# Patient Record
Sex: MALE | Race: WHITE | NOT HISPANIC OR LATINO | URBAN - METROPOLITAN AREA
[De-identification: names, ages, dates, MRNs, and addresses within clinical notes are randomized per-mention and may not be internally consistent; named-entity substitution may affect disease eponyms.]

---

## 2018-03-10 ENCOUNTER — INPATIENT (INPATIENT)
Facility: HOSPITAL | Age: 68
LOS: 11 days | Discharge: HOME | DRG: 301 | End: 2018-03-22
Attending: INTERNAL MEDICINE | Admitting: INTERNAL MEDICINE
Payer: COMMERCIAL

## 2018-03-10 VITALS
HEART RATE: 100 BPM | RESPIRATION RATE: 18 BRPM | DIASTOLIC BLOOD PRESSURE: 84 MMHG | OXYGEN SATURATION: 96 % | SYSTOLIC BLOOD PRESSURE: 137 MMHG | TEMPERATURE: 98 F

## 2018-03-10 DIAGNOSIS — I82.409 ACUTE EMBOLISM AND THROMBOSIS OF UNSPECIFIED DEEP VEINS OF UNSPECIFIED LOWER EXTREMITY: ICD-10-CM

## 2018-03-10 LAB
ALBUMIN SERPL ELPH-MCNC: 3.5 G/DL — SIGNIFICANT CHANGE UP (ref 3.5–5)
ALP SERPL-CCNC: 45 U/L — SIGNIFICANT CHANGE UP (ref 40–120)
ALT FLD-CCNC: 18 U/L DA — SIGNIFICANT CHANGE UP (ref 10–60)
ANION GAP SERPL CALC-SCNC: 5 MMOL/L — SIGNIFICANT CHANGE UP (ref 5–17)
APTT BLD: 42.2 SEC — HIGH (ref 27.5–37.4)
AST SERPL-CCNC: 21 U/L — SIGNIFICANT CHANGE UP (ref 10–40)
BASOPHILS # BLD AUTO: 0.1 K/UL — SIGNIFICANT CHANGE UP (ref 0–0.2)
BASOPHILS NFR BLD AUTO: 0.9 % — SIGNIFICANT CHANGE UP (ref 0–2)
BILIRUB SERPL-MCNC: 0.3 MG/DL — SIGNIFICANT CHANGE UP (ref 0.2–1.2)
BUN SERPL-MCNC: 14 MG/DL — SIGNIFICANT CHANGE UP (ref 7–18)
CALCIUM SERPL-MCNC: 9.2 MG/DL — SIGNIFICANT CHANGE UP (ref 8.4–10.5)
CHLORIDE SERPL-SCNC: 108 MMOL/L — SIGNIFICANT CHANGE UP (ref 96–108)
CO2 SERPL-SCNC: 28 MMOL/L — SIGNIFICANT CHANGE UP (ref 22–31)
CREAT SERPL-MCNC: 0.72 MG/DL — SIGNIFICANT CHANGE UP (ref 0.5–1.3)
EOSINOPHIL # BLD AUTO: 0.1 K/UL — SIGNIFICANT CHANGE UP (ref 0–0.5)
EOSINOPHIL NFR BLD AUTO: 1.5 % — SIGNIFICANT CHANGE UP (ref 0–6)
GLUCOSE SERPL-MCNC: 97 MG/DL — SIGNIFICANT CHANGE UP (ref 70–99)
HCT VFR BLD CALC: 42.2 % — SIGNIFICANT CHANGE UP (ref 39–50)
HGB BLD-MCNC: 13.4 G/DL — SIGNIFICANT CHANGE UP (ref 13–17)
INR BLD: 1.71 RATIO — HIGH (ref 0.88–1.16)
LYMPHOCYTES # BLD AUTO: 1.7 K/UL — SIGNIFICANT CHANGE UP (ref 1–3.3)
LYMPHOCYTES # BLD AUTO: 21.6 % — SIGNIFICANT CHANGE UP (ref 13–44)
MCHC RBC-ENTMCNC: 31.8 GM/DL — LOW (ref 32–36)
MCHC RBC-ENTMCNC: 32.2 PG — SIGNIFICANT CHANGE UP (ref 27–34)
MCV RBC AUTO: 101.4 FL — HIGH (ref 80–100)
MONOCYTES # BLD AUTO: 0.6 K/UL — SIGNIFICANT CHANGE UP (ref 0–0.9)
MONOCYTES NFR BLD AUTO: 7.1 % — SIGNIFICANT CHANGE UP (ref 2–14)
NEUTROPHILS # BLD AUTO: 5.4 K/UL — SIGNIFICANT CHANGE UP (ref 1.8–7.4)
NEUTROPHILS NFR BLD AUTO: 69 % — SIGNIFICANT CHANGE UP (ref 43–77)
PLATELET # BLD AUTO: 248 K/UL — SIGNIFICANT CHANGE UP (ref 150–400)
POTASSIUM SERPL-MCNC: 4.4 MMOL/L — SIGNIFICANT CHANGE UP (ref 3.5–5.3)
POTASSIUM SERPL-SCNC: 4.4 MMOL/L — SIGNIFICANT CHANGE UP (ref 3.5–5.3)
PROT SERPL-MCNC: 7.3 G/DL — SIGNIFICANT CHANGE UP (ref 6–8.3)
PROTHROM AB SERPL-ACNC: 18.8 SEC — HIGH (ref 9.8–12.7)
RBC # BLD: 4.16 M/UL — LOW (ref 4.2–5.8)
RBC # FLD: 12.3 % — SIGNIFICANT CHANGE UP (ref 10.3–14.5)
SODIUM SERPL-SCNC: 141 MMOL/L — SIGNIFICANT CHANGE UP (ref 135–145)
WBC # BLD: 7.8 K/UL — SIGNIFICANT CHANGE UP (ref 3.8–10.5)
WBC # FLD AUTO: 7.8 K/UL — SIGNIFICANT CHANGE UP (ref 3.8–10.5)

## 2018-03-10 PROCEDURE — 99285 EMERGENCY DEPT VISIT HI MDM: CPT

## 2018-03-10 PROCEDURE — 93971 EXTREMITY STUDY: CPT | Mod: 26,RT

## 2018-03-10 RX ORDER — ENOXAPARIN SODIUM 100 MG/ML
80 INJECTION SUBCUTANEOUS ONCE
Qty: 0 | Refills: 0 | Status: COMPLETED | OUTPATIENT
Start: 2018-03-10 | End: 2018-03-10

## 2018-03-10 RX ADMIN — ENOXAPARIN SODIUM 80 MILLIGRAM(S): 100 INJECTION SUBCUTANEOUS at 22:44

## 2018-03-10 NOTE — ED PROVIDER NOTE - ATTENDING CONTRIBUTION TO CARE
Attending MD Felix:   I personally have seen and examined this patient.  NP note reviewed and agree on plan of care and except where noted.  See MDM for details.

## 2018-03-10 NOTE — ED ADULT TRIAGE NOTE - CHIEF COMPLAINT QUOTE
Right lower leg pain and swelling. Patient recently d/c from [facility] with diagnosis of DVT, currently on Xeralto.

## 2018-03-10 NOTE — ED ADULT NURSE NOTE - CHIEF COMPLAINT QUOTE
Right lower leg pain and swelling. Patient recently d/c from [facility] with diagnosis of DVT, currently on Xeralto.

## 2018-03-10 NOTE — ED PROVIDER NOTE - MEDICAL DECISION MAKING DETAILS
68 y/o male referred to ED for further eval of R leg DVT. No records of previous results or current ultrasound results. Will get doppler and reassess. 66 y/o male referred to ED for further eval of R leg DVT. No records of previous results or current ultrasound results. Will get doppler and reassess.    Elvira PEREZ: 66 y/o male with recently diagnosed RLE DVT on Xarelto for 3 weeks here for eval of worsening RLE swelling. Patient saw his PMD today who performed another US and referred him here. Denies signs or symptoms of PE. Eval shows a man in NAD with clear lungs and cardiac S1S2 noted. RLE swelling w/o erythema. Plan DVT US and reassess.

## 2018-03-10 NOTE — ED PROVIDER NOTE - OBJECTIVE STATEMENT
66 y/o male with PMHx of DVT presents to the ED c/o RLE DVT x yesterday. Pt notes he was seen in [facility] a while back and was Dx with RLE DVT, was Rx Xarelto (15mg). Pt had f/u with doctor yesterday, ultrasound still showed DVT. Dose was increased to 20 mg and referred to the ED for further eval. Pt denies dizziness, chest pain, shortness of breath, dyspnea on exertion, fever, or any other complaints. NKDA. 68 y/o male with PMHx of DVT (diagnosed 3 weeks ago at [facility]) presents to the ED c/o RLE worsening pain/swelling. Pt notes he was seen in [facility] a while back and was Dx with RLE DVT, was Rx Xarelto (15mg). Pt had f/u with doctor today, had repeat ultrasound and was referred to ED. Dose was increased to 20 mg and referred to the ED for further eval. Pt denies dizziness, chest pain, shortness of breath, dyspnea on exertion, fever, or any other complaints. NKDA.

## 2018-03-10 NOTE — ED PROVIDER NOTE - PROGRESS NOTE DETAILS
US shows extensive DVT. Unable to compare to previous US. Patient denies cardiac complaints. Discussed case with Dr Isabel. Will admit. Discussed with PMD Dr Anderson and patient. Will give dose of Lovenox. Elvira PEREZ: Patient reassessed and reported persistent pain on his RLE and being on Xarelto for the past 3 weeks (15 mg PO BID) and changing the dose to 20 mg today. Patient saw his PMD today and had an US that showed a larger DVT burden. Patient denies CP, lightheaededness, CP or palpitations or syncope. Consider worse burden of DVT or Phlebitis. Plan DVT US and reassess. Patient will need admission if symptoms worsen or DVT is found to be larger.

## 2018-03-11 DIAGNOSIS — R06.09 OTHER FORMS OF DYSPNEA: ICD-10-CM

## 2018-03-11 DIAGNOSIS — R52 PAIN, UNSPECIFIED: ICD-10-CM

## 2018-03-11 DIAGNOSIS — I82.409 ACUTE EMBOLISM AND THROMBOSIS OF UNSPECIFIED DEEP VEINS OF UNSPECIFIED LOWER EXTREMITY: ICD-10-CM

## 2018-03-11 DIAGNOSIS — Z29.9 ENCOUNTER FOR PROPHYLACTIC MEASURES, UNSPECIFIED: ICD-10-CM

## 2018-03-11 LAB
ANION GAP SERPL CALC-SCNC: 4 MMOL/L — LOW (ref 5–17)
BUN SERPL-MCNC: 13 MG/DL — SIGNIFICANT CHANGE UP (ref 7–18)
CALCIUM SERPL-MCNC: 8.7 MG/DL — SIGNIFICANT CHANGE UP (ref 8.4–10.5)
CHLORIDE SERPL-SCNC: 106 MMOL/L — SIGNIFICANT CHANGE UP (ref 96–108)
CK MB BLD-MCNC: <2.1 % — SIGNIFICANT CHANGE UP (ref 0–3.5)
CK MB CFR SERPL CALC: <1 NG/ML — SIGNIFICANT CHANGE UP (ref 0–3.6)
CK SERPL-CCNC: 47 U/L — SIGNIFICANT CHANGE UP (ref 35–232)
CK SERPL-CCNC: 74 U/L — SIGNIFICANT CHANGE UP (ref 35–232)
CO2 SERPL-SCNC: 28 MMOL/L — SIGNIFICANT CHANGE UP (ref 22–31)
CREAT SERPL-MCNC: 0.81 MG/DL — SIGNIFICANT CHANGE UP (ref 0.5–1.3)
FOLATE SERPL-MCNC: 5.9 NG/ML — SIGNIFICANT CHANGE UP (ref 4.8–24.2)
GLUCOSE SERPL-MCNC: 114 MG/DL — HIGH (ref 70–99)
MAGNESIUM SERPL-MCNC: 1.9 MG/DL — SIGNIFICANT CHANGE UP (ref 1.6–2.6)
PHOSPHATE SERPL-MCNC: 3.4 MG/DL — SIGNIFICANT CHANGE UP (ref 2.5–4.5)
POTASSIUM SERPL-MCNC: 4.1 MMOL/L — SIGNIFICANT CHANGE UP (ref 3.5–5.3)
POTASSIUM SERPL-SCNC: 4.1 MMOL/L — SIGNIFICANT CHANGE UP (ref 3.5–5.3)
SODIUM SERPL-SCNC: 138 MMOL/L — SIGNIFICANT CHANGE UP (ref 135–145)
TROPONIN I SERPL-MCNC: <0.015 NG/ML — SIGNIFICANT CHANGE UP (ref 0–0.04)
VIT B12 SERPL-MCNC: 267 PG/ML — SIGNIFICANT CHANGE UP (ref 232–1245)

## 2018-03-11 PROCEDURE — 71275 CT ANGIOGRAPHY CHEST: CPT | Mod: 26

## 2018-03-11 PROCEDURE — G0452: CPT | Mod: 26

## 2018-03-11 PROCEDURE — 71045 X-RAY EXAM CHEST 1 VIEW: CPT | Mod: 26

## 2018-03-11 RX ORDER — IBUPROFEN 200 MG
400 TABLET ORAL EVERY 8 HOURS
Qty: 0 | Refills: 0 | Status: DISCONTINUED | OUTPATIENT
Start: 2018-03-11 | End: 2018-03-11

## 2018-03-11 RX ORDER — ACETAMINOPHEN 500 MG
650 TABLET ORAL EVERY 6 HOURS
Qty: 0 | Refills: 0 | Status: DISCONTINUED | OUTPATIENT
Start: 2018-03-11 | End: 2018-03-22

## 2018-03-11 RX ORDER — PANTOPRAZOLE SODIUM 20 MG/1
40 TABLET, DELAYED RELEASE ORAL
Qty: 0 | Refills: 0 | Status: DISCONTINUED | OUTPATIENT
Start: 2018-03-11 | End: 2018-03-22

## 2018-03-11 RX ORDER — MORPHINE SULFATE 50 MG/1
2 CAPSULE, EXTENDED RELEASE ORAL ONCE
Qty: 0 | Refills: 0 | Status: DISCONTINUED | OUTPATIENT
Start: 2018-03-11 | End: 2018-03-11

## 2018-03-11 RX ORDER — SODIUM CHLORIDE 9 MG/ML
500 INJECTION INTRAMUSCULAR; INTRAVENOUS; SUBCUTANEOUS ONCE
Qty: 0 | Refills: 0 | Status: COMPLETED | OUTPATIENT
Start: 2018-03-11 | End: 2018-03-11

## 2018-03-11 RX ORDER — MORPHINE SULFATE 50 MG/1
2 CAPSULE, EXTENDED RELEASE ORAL EVERY 6 HOURS
Qty: 0 | Refills: 0 | Status: DISCONTINUED | OUTPATIENT
Start: 2018-03-11 | End: 2018-03-11

## 2018-03-11 RX ORDER — SODIUM CHLORIDE 9 MG/ML
1000 INJECTION INTRAMUSCULAR; INTRAVENOUS; SUBCUTANEOUS
Qty: 0 | Refills: 0 | Status: DISCONTINUED | OUTPATIENT
Start: 2018-03-11 | End: 2018-03-22

## 2018-03-11 RX ORDER — ENOXAPARIN SODIUM 100 MG/ML
80 INJECTION SUBCUTANEOUS
Qty: 0 | Refills: 0 | Status: DISCONTINUED | OUTPATIENT
Start: 2018-03-11 | End: 2018-03-12

## 2018-03-11 RX ORDER — KETOROLAC TROMETHAMINE 30 MG/ML
15 SYRINGE (ML) INJECTION ONCE
Qty: 0 | Refills: 0 | Status: DISCONTINUED | OUTPATIENT
Start: 2018-03-11 | End: 2018-03-11

## 2018-03-11 RX ADMIN — ENOXAPARIN SODIUM 80 MILLIGRAM(S): 100 INJECTION SUBCUTANEOUS at 05:35

## 2018-03-11 RX ADMIN — SODIUM CHLORIDE 1000 MILLILITER(S): 9 INJECTION INTRAMUSCULAR; INTRAVENOUS; SUBCUTANEOUS at 12:44

## 2018-03-11 RX ADMIN — MORPHINE SULFATE 2 MILLIGRAM(S): 50 CAPSULE, EXTENDED RELEASE ORAL at 12:41

## 2018-03-11 RX ADMIN — SODIUM CHLORIDE 100 MILLILITER(S): 9 INJECTION INTRAMUSCULAR; INTRAVENOUS; SUBCUTANEOUS at 12:43

## 2018-03-11 RX ADMIN — MORPHINE SULFATE 2 MILLIGRAM(S): 50 CAPSULE, EXTENDED RELEASE ORAL at 13:23

## 2018-03-11 RX ADMIN — ENOXAPARIN SODIUM 80 MILLIGRAM(S): 100 INJECTION SUBCUTANEOUS at 17:31

## 2018-03-11 RX ADMIN — Medication 400 MILLIGRAM(S): at 08:19

## 2018-03-11 RX ADMIN — Medication 400 MILLIGRAM(S): at 08:55

## 2018-03-11 NOTE — H&P ADULT - NSHPREVIEWOFSYSTEMS_GEN_ALL_CORE
REVIEW OF SYSTEMS:    CONSTITUTIONAL: No weakness, fevers or chills  EYES/ENT: No visual changes;  No vertigo or throat pain   NECK: No pain or stiffness  RESPIRATORY: No cough, wheezing, hemoptysis; No shortness of breath  CARDIOVASCULAR: No chest pain or palpitations  GASTROINTESTINAL: No abdominal or epigastric pain. No nausea, vomiting, or hematemesis; No diarrhea or constipation. No melena or hematochezia.  GENITOURINARY: No dysuria, frequency or hematuria  NEUROLOGICAL: No numbness or weakness  EXT; right lower extremities swelling and pain and redness.   SKIN: No itching, rashes  No other complaint except mentioned as above.

## 2018-03-11 NOTE — H&P ADULT - NSHPPHYSICALEXAM_GEN_ALL_CORE
PHYSICAL EXAM:    GENERAL: NAD, well-developed    HEAD:  Atraumatic, Normocephalic    EYES: EOMI, PERRLA, conjunctiva and sclera clear    NECK: Supple, No JVD    CHEST/LUNG: Clear to auscultation bilaterally; No wheeze    HEART: Regular rate and rhythm; No murmurs, rubs, or gallops    ABDOMEN: Soft, Nontender, Nondistended; Bowel sounds present    EXTREMITIES:  2+ Peripheral Pulses, No clubbing, cyanosis,   red, swelling and edema of right lower extremities    PSYCH: AAOx3    NEUROLOGY: non-focal    SKIN: No rashes or lesions    No other pertinent positive finding except mentioned as above.

## 2018-03-11 NOTE — PROGRESS NOTE ADULT - SUBJECTIVE AND OBJECTIVE BOX
Patient seen and examined at bedside  Case discussed with medical team    c/o severe rle pain, malaise, pleuritic substernal chest pain     HPI:  67 M with history of DVT started having right lower leg pain 2 months ago and seen at [facility] and diagnosed with DVT 3 weeks ago and started on Xarelto 15mg BID and today switched to Xarelto 20mg daily presented with right lower leg swelling getting worse. pt followed up with PCP for persistent swelling and pain and repeated doppler study outpatient which showed clot increased in size and was referred to go to ED. Patient denied fever, chest pain, palpitation, dyspnea, nausea, vomiting and any other symptoms and claimed his symptoms does not limit his ambulatory status.  In ED, doppler study with Above and below the knee thrombosis of the right lower extremity. (11 Mar 2018 04:42)      PAST MEDICAL & SURGICAL HISTORY:  DVT (deep venous thrombosis)  No significant past surgical history      No Known Allergies       MEDICATIONS  (STANDING):  enoxaparin Injectable 80 milliGRAM(s) SubCutaneous two times a day    MEDICATIONS  (PRN):  acetaminophen   Tablet. 650 milliGRAM(s) Oral every 6 hours PRN Mild Pain (1 - 3)  ibuprofen  Tablet 400 milliGRAM(s) Oral every 8 hours PRN moderate pain      REVIEW OF SYSTEMS:  CONSTITUTIONAL: (+) malaise.   EYES: No acute change in vision   ENT:  No tinnitus  NECK: No stiffness  RESPIRATORY: No hemoptysis  CARDIOVASCULAR: No chest pain, palpitations, syncope  GASTROINTESTINAL: No hematemesis, diarrhea, melena, or hematochezia.  GENITOURINARY: No hematuria  NEUROLOGICAL: No headaches  LYMPH Nodes: No enlarged glands  ENDOCRINE: No heat or cold intolerance	    T(C): 36.8 (03-11-18 @ 09:36), Max: 37.1 (03-10-18 @ 20:40)  HR: 66 (03-11-18 @ 09:36) (65 - 100)  BP: 107/70 (03-11-18 @ 09:36) (107/70 - 137/84)  RR: 16 (03-11-18 @ 09:36) (16 - 18)  SpO2: 96% (03-11-18 @ 09:36) (96% - 98%)    PHYSICAL EXAMINATION:   Constitutional: WD, NAD  HEENT: NC, AT  Neck:  Supple  Respiratory:  Adequate airflow b/l. Not using accessory muscles of respiration.  Cardiovascular:  S1 & S2 intact, no R/G, 2+ radial pulses b/l  Gastrointestinal: Soft, NT, ND, normoactive b.s., no organomegaly/RT/rigidity  Extremities: WWP  Neurological:  Alert and awake.  No acute focal motor deficits. Crude sensation intact.     Labs and imaging reviewed    LABS:                        13.4   7.8   )-----------( 248      ( 10 Mar 2018 16:59 )             42.2     03-10    141  |  108  |  14  ----------------------------<  97  4.4   |  28  |  0.72    Ca    9.2      10 Mar 2018 16:59    TPro  7.3  /  Alb  3.5  /  TBili  0.3  /  DBili  x   /  AST  21  /  ALT  18  /  AlkPhos  45  03-10    CARDIAC MARKERS ( 11 Mar 2018 03:13 )  x     / x     / 74 U/L / x     / x          PT/INR - ( 10 Mar 2018 16:59 )   PT: 18.8 sec;   INR: 1.71 ratio         PTT - ( 10 Mar 2018 16:59 )  PTT:42.2 sec    CAPILLARY BLOOD GLUCOSE            LIVER FUNCTIONS - ( 10 Mar 2018 16:59 )  Alb: 3.5 g/dL / Pro: 7.3 g/dL / ALK PHOS: 45 U/L / ALT: 18 U/L DA / AST: 21 U/L / GGT: x               RADIOLOGY & ADDITIONAL STUDIES:    < from: Xray Chest 1 View- PORTABLE-Urgent (03.11.18 @ 09:32) >    EXAM:  XR CHEST PORTABLE URGENT 1V                            PROCEDURE DATE:  03/11/2018          INTERPRETATION:  CLINICAL INFORMATION: Abnormal chest sounds.    TECHNIQUE: Frontal view of the chest   COMPARISON: None.    FINDINGS:    LUNGS/PLEURA: No focal consolidation, pleural effusion, or pneumothorax.  MEDIASTINUM: Cardiomediastinal silhouette is unremarkable.  OTHER: Old right-sided rib fractures.    IMPRESSION:     No focal consolidation or pleural effusion.            < end of copied text >

## 2018-03-11 NOTE — CONSULT NOTE ADULT - SUBJECTIVE AND OBJECTIVE BOX
Patient is a 67y old  Male who presents with a chief complaint of right lower leg pain (11 Mar 2018 04:42)    Apparently unprovoked right lower ext DVT three weeks back when he presented with leg swellng and pain. As per wife, in [facility] he was found to have a mild embolus in lung also. He was discharged on Xarelto 15 bid. It improved a bit but started to worsen, saw primary physician who advised him to go to ER  Here found to  have extensive R lower ext DVT. He is now switched to Full dose lovenox and About to undergo CTA    Denies any pmh  He is retired and worked in construction  Family: Mother had liver cancer      HPI:  67 M with history of DVT started having right lower leg pain 2 months ago and seen at [facility] and diagnosed with DVT 3 weeks ago and started on Xarelto 15mg BID and today switched to Xarelto 20mg daily presented with right lower leg swelling getting worse. pt followed up with PCP for persistent swelling and pain and repeated doppler study outpatient which showed clot increased in size and was referred to go to ED. Patient denied fever, chest pain, palpitation, dyspnea, nausea, vomiting and any other symptoms and claimed his symptoms does not limit his ambulatory status.  In ED, doppler study with Above and below the knee thrombosis of the right lower extremity. (11 Mar 2018 04:42)       ROS:  no headaches, no chest pain, no nausea no vomiting, no headaches. no itche or rash.   Has right leg swelling and tenderness          MEDICATIONS  (STANDING):  enoxaparin Injectable 80 milliGRAM(s) SubCutaneous two times a day  pantoprazole    Tablet 40 milliGRAM(s) Oral before breakfast  sodium chloride 0.9%. 1000 milliLiter(s) (100 mL/Hr) IV Continuous <Continuous>    MEDICATIONS  (PRN):  acetaminophen   Tablet. 650 milliGRAM(s) Oral every 6 hours PRN Mild Pain (1 - 3)  morphine  - Injectable 2 milliGRAM(s) IV Push every 6 hours PRN Moderate Pain (4 - 6)      Allergies    No Known Allergies    Intolerances        Vital Signs Last 24 Hrs  T(C): 36.3 (11 Mar 2018 12:47), Max: 37.1 (10 Mar 2018 20:40)  T(F): 97.3 (11 Mar 2018 12:47), Max: 98.7 (10 Mar 2018 20:40)  HR: 72 (11 Mar 2018 12:47) (65 - 100)  BP: 109/78 (11 Mar 2018 12:47) (107/70 - 137/84)  BP(mean): --  RR: 16 (11 Mar 2018 12:47) (16 - 18)  SpO2: 97% (11 Mar 2018 12:47) (96% - 98%)    PHYSICAL EXAM  General: adult in NAD  HEENT: clear oropharynx, anicteric sclera, pink conjunctiva  Neck: supple  CV: normal S1/S2 with no murmur rubs or gallops  Lungs: positive air movement b/l ant lungs,clear to auscultation, no wheezes, no rales  Abdomen: soft non-tender non-distended, Liver palpalble  Ext:  right leg swelling and tenderness  Skin: no rashes and no petechiae  Neuro: alert and oriented X 4, no focal deficits      LABS:                          13.4   7.8   )-----------( 248      ( 10 Mar 2018 16:59 )             42.2         Mean Cell Volume : 101.4 fl  Mean Cell Hemoglobin : 32.2 pg  Mean Cell Hemoglobin Concentration : 31.8 gm/dL  Auto Neutrophil # : 5.4 K/uL  Auto Lymphocyte # : 1.7 K/uL  Auto Monocyte # : 0.6 K/uL  Auto Eosinophil # : 0.1 K/uL  Auto Basophil # : 0.1 K/uL  Auto Neutrophil % : 69.0 %  Auto Lymphocyte % : 21.6 %  Auto Monocyte % : 7.1 %  Auto Eosinophil % : 1.5 %  Auto Basophil % : 0.9 %      Serial CBC's  03-10 @ 16:59  Hct-42.2 / Hgb-13.4 / Plat-248 / RBC-4.16 / WBC-7.8      03-11    138  |  106  |  13  ----------------------------<  114<H>  4.1   |  28  |  0.81    Ca    8.7      11 Mar 2018 12:21  Phos  3.4     03-11  Mg     1.9     03-11    TPro  7.3  /  Alb  3.5  /  TBili  0.3  /  DBili  x   /  AST  21  /  ALT  18  /  AlkPhos  45  03-10      PT/INR - ( 10 Mar 2018 16:59 )   PT: 18.8 sec;   INR: 1.71 ratio         PTT - ( 10 Mar 2018 16:59 )  PTT:42.2 sec

## 2018-03-11 NOTE — H&P ADULT - PROBLEM SELECTOR PLAN 1
-Doppler with right above and below knee clot  -unprovoked and failed outpatient Xarelto treatement  -failed outpatient Xarelto treatment  -will give Lovenox full dose  - f/u genetic work up for coagulation factor -Doppler with right above and below knee clot  -unprovoked and failed outpatient Xarelto treatement  -failed outpatient Xarelto treatment  -will give Lovenox full dose  - f/u genetic work up for coagulation factor  -Hem/onc-  -Cardio-  -monitor clinical status, replace electrolytes prn, monitor vitals -Doppler with right above and below knee clot  -unprovoked and failed outpatient Xarelto treatement  -failed outpatient Xarelto treatment  -will give Lovenox full dose  -initially tachy which improved. F/u cxr.  - f/u genetic work up for coagulation factor  -Hem/onc-  -Cardio-  -monitor clinical status, replace electrolytes prn, monitor vitals

## 2018-03-11 NOTE — H&P ADULT - NSHPLABSRESULTS_GEN_ALL_CORE
Vital Signs Last 24 Hrs  T(C): 36.4 (11 Mar 2018 00:01), Max: 37.1 (10 Mar 2018 20:40)  T(F): 97.5 (11 Mar 2018 00:01), Max: 98.7 (10 Mar 2018 20:40)  HR: 65 (11 Mar 2018 00:01) (65 - 100)  BP: 113/75 (11 Mar 2018 00:01) (113/75 - 137/84)  BP(mean): --  RR: 18 (11 Mar 2018 00:01) (16 - 18)  SpO2: 98% (11 Mar 2018 00:01) (96% - 98%)      Labs:                        13.4   7.8   )-----------( 248      ( 10 Mar 2018 16:59 )             42.2     03-10    141  |  108  |  14  ----------------------------<  97  4.4   |  28  |  0.72    Ca    9.2      10 Mar 2018 16:59    TPro  7.3  /  Alb  3.5  /  TBili  0.3  /  DBili  x   /  AST  21  /  ALT  18  /  AlkPhos  45  03-10    < from: US Duplex Venous Lower Ext Ltd, Right (03.10.18 @ 19:19) >    Above and below the knee thrombosis of the right lower extremity as   described    < end of copied text >

## 2018-03-11 NOTE — H&P ADULT - ASSESSMENT
67 M with history of DVT started having right lower leg pain 2 months ago and seen at [place] and diagnosed with DVT 3 weeks ago and started on Xarelto 15mg BID and today switched to Xarelto 20mg daily presented with right lower leg swelling getting worse.

## 2018-03-11 NOTE — CONSULT NOTE ADULT - ASSESSMENT
APPARENTLY UNPROVOKED RIGHT LOWER EXT DVT AND POSSIBLE PE  Did not respond to xarelto after some initial improvement  Now on Lovenox    Recommend the Followin. Check CT Abdomen and Pelvis for organomegaly and any lymphadenopathy in the right lower quadrant. It is unusual not to respond initially to DOAC's unless there is a local physical issue ( rule ot malignancy)  2. Change Lovenox from twice a day to every 12 hours for a stable drug level round the clock.  3. check for antiphospholipid antibodies ( lupus anticoagulant will be unreliable while on anticoagulation). Rest of 'hypercoag' workup is not needed. Positive antiphospholipid antibody, if confirmed, will need life long anticoagulation  4. If malignancy or other local reason is ruled out , he will need life long anticoagulation  5. check d dimer     will follow    Nasir Gondal  [phone] APPARENTLY UNPROVOKED RIGHT LOWER EXT DVT AND POSSIBLE PE  Did not respond to xarelto after some initial improvement  Now on Lovenox    Recommend the Followin. Check CT Abdomen and Pelvis for organomegaly and any lymphadenopathy in the right lower quadrant. It is unusual not to respond initially to DOAC's unless there is a local physical issue ( rule ot malignancy)  2. Change Lovenox from twice a day to every 12 hours for a stable drug level round the clock.  3. check for antiphospholipid antibodies ( lupus anticoagulant will be unreliable while on anticoagulation). Rest of 'hypercoag' workup is not needed. Positive antiphospholipid antibody, if confirmed, will need life long anticoagulation  4. If malignancy or other local reason is ruled out , he will need life long anticoagulation  5. check d dimer   6. High MCV. Check b12 folate, tsh  7. Mildly elevated coags are due to xarelto    will follow    Nasir Gondal  [phone]

## 2018-03-11 NOTE — H&P ADULT - HISTORY OF PRESENT ILLNESS
67 M with history of DVT started having right lower leg pain 2 months ago and seen at [place] and diagnosed with DVT 3 weeks ago and started on Xarelto 15mg BID and today switched to Xarelto 20mg daily presented with right lower leg swelling getting worse. pt followed up with PCP for persistent swelling and pain and repeated doppler study outpatient which showed clot increased in size and was referred to go to ED. Patient denied fever, chest pain, palpitation, dyspnea, nausea, vomiting and any other symptoms and claimed his symptoms does not limit his ambulatory status.  In ED, doppler study with Above and below the knee thrombosis of the right lower extremity.

## 2018-03-11 NOTE — CONSULT NOTE ADULT - SUBJECTIVE AND OBJECTIVE BOX
Time of visit:    CHIEF COMPLAINT: Patient is a 67y old  Male who presents with a chief complaint of right lower leg pain (11 Mar 2018 04:42)      HPI:  67 M with history of DVT started having right lower leg pain 2 months ago and seen at [place] and diagnosed with DVT 3 weeks ago and started on Xarelto 15mg BID and today switched to Xarelto 20mg daily presented with right lower leg swelling getting worse. pt followed up with PCP for persistent swelling and pain and repeated doppler study outpatient which showed clot increased in size and was referred to go to ED. Patient denied fever, chest pain, palpitation, dyspnea, nausea, vomiting and any other symptoms and claimed his symptoms does not limit his ambulatory status.  In ED, doppler study with Above and below the knee thrombosis of the right lower extremity. (11 Mar 2018 04:42)   Patient seen and examined.     PAST MEDICAL & SURGICAL HISTORY:  DVT (deep venous thrombosis)  No significant past surgical history      Allergies    No Known Allergies    Intolerances        MEDICATIONS  (STANDING):  enoxaparin Injectable 80 milliGRAM(s) SubCutaneous every 12 hours  pantoprazole    Tablet 40 milliGRAM(s) Oral before breakfast  sodium chloride 0.9%. 1000 milliLiter(s) (100 mL/Hr) IV Continuous <Continuous>      MEDICATIONS  (PRN):  acetaminophen   Tablet. 650 milliGRAM(s) Oral every 6 hours PRN Mild Pain (1 - 3)  morphine  - Injectable 2 milliGRAM(s) IV Push every 6 hours PRN Moderate Pain (4 - 6)       Medications up to date at time of exam.    FAMILY HISTORY:  No pertinent family history in first degree relatives      SOCIAL HISTORY  Smoking History: [   ] smoking/smoke exposure, [   ] former smoker, [   ] denies smoking  Living Condition: [   ] apartment, [   ] private house  Work History:   Travel History: denies recent travel  Illicit Substance Use: denies  Alcohol Use: denies    REVIEW OF SYSTEMS:    CONSTITUTIONAL:  denies fevers, chills, sweats, weight loss    HEENT:  denies diplopia or blurred vision, sore throat or runny nose.    CARDIOVASCULAR:  denies pressure, squeezing, tightness, or heaviness about the chest; no palpitations.    RESPIRATORY:  denies SOB, cough, SUTTON, wheezing.    GASTROINTESTINAL:  denies abdominal pain, nausea, vomiting or diarrhea.    GENITOURINARY: denies dysuria, frequency or urgency.    NEUROLOGIC:  denies numbness, tingling, seizures or weakness.    PSYCHIATRIC:  denies disorder of thought or mood.    MSK: denies swelling, redness      PHYSICAL EXAMINATION:    GENERAL: The patient is a well-developed, well-nourished, in no apparent distress.     Vital Signs Last 24 Hrs  T(C): 36.3 (12 Mar 2018 17:24), Max: 37.1 (12 Mar 2018 05:44)  T(F): 97.3 (12 Mar 2018 17:24), Max: 98.7 (12 Mar 2018 05:44)  HR: 69 (12 Mar 2018 17:24) (60 - 80)  BP: 117/72 (12 Mar 2018 17:24) (110/71 - 125/83)  BP(mean): --  RR: 19 (12 Mar 2018 17:24) (16 - 19)  SpO2: 100% (12 Mar 2018 17:24) (96% - 100%)    HEENT: head is normocephalic and atraumatic.     NECK: supple, no palpable adenopathy.    LUNGS: clear to auscultation, no wheezing, rales, or rhonchi.    HEART: regular rate and rhythm without murmur.    ABDOMEN: soft, nontender, and nondistended.     EXTREMITIES: without any cyanosis, clubbing, rash, lesions or edema.    NEUROLOGIC: awake, alert.    SKIN: warm, dry, good turgor.      LABS:    03-12    141  |  107  |  11  ----------------------------<  96  4.2   |  28  |  0.68    Ca    8.9      12 Mar 2018 06:19  Phos  3.4     03-11  Mg     1.9     03-11            CARDIAC MARKERS ( 11 Mar 2018 15:29 )  <0.015 ng/mL / x     / 47 U/L / x     / <1.0 ng/mL  CARDIAC MARKERS ( 11 Mar 2018 03:13 )  x     / x     / 74 U/L / x     / x                    .    MICROBIOLOGY: (if applicable)    RADIOLOGY & ADDITIONAL STUDIES:  EKG:   CXR:  ECHO:  TELE:    IMPRESSION: 67y Male PAST MEDICAL & SURGICAL HISTORY:  DVT (deep venous thrombosis)  No significant past surgical history       p/w     RECOMMENDATIONS: Time of visit:    CHIEF COMPLAINT: Patient is a 67y old  Male who presents with a chief complaint of right lower leg pain (11 Mar 2018 04:42)      HPI:  67 M with history of DVT started having right lower leg pain 2 months ago and seen at [place] and diagnosed with DVT 3 weeks ago and started on Xarelto 15mg BID and today switched to Xarelto 20mg daily presented with right lower leg swelling getting worse. pt followed up with PCP for persistent swelling and pain and repeated doppler study outpatient which showed clot increased in size and was referred to go to ED. Patient denied fever, chest pain, palpitation, dyspnea, nausea, vomiting and any other symptoms and claimed his symptoms does not limit his ambulatory status.  In ED, doppler study with Above and below the knee thrombosis of the right lower extremity. (11 Mar 2018 04:42)   Patient seen and examined.     PAST MEDICAL & SURGICAL HISTORY:  DVT (deep venous thrombosis)  No significant past surgical history      Allergies    No Known Allergies    Intolerances        MEDICATIONS  (STANDING):  enoxaparin Injectable 80 milliGRAM(s) SubCutaneous every 12 hours  pantoprazole    Tablet 40 milliGRAM(s) Oral before breakfast  sodium chloride 0.9%. 1000 milliLiter(s) (100 mL/Hr) IV Continuous <Continuous>      MEDICATIONS  (PRN):  acetaminophen   Tablet. 650 milliGRAM(s) Oral every 6 hours PRN Mild Pain (1 - 3)  morphine  - Injectable 2 milliGRAM(s) IV Push every 6 hours PRN Moderate Pain (4 - 6)       Medications up to date at time of exam.    FAMILY HISTORY:  No pertinent family history in first degree relatives      SOCIAL HISTORY  Smoking History: [   ] smoking/smoke exposure, [   ] former smoker, [   ] denies smoking  Living Condition: [   ] apartment, [   ] private house  Work History:   Travel History: denies recent travel  Illicit Substance Use: denies  Alcohol Use: denies    REVIEW OF SYSTEMS:    CONSTITUTIONAL:  denies fevers, chills, sweats, weight loss    HEENT:  denies diplopia or blurred vision, sore throat or runny nose.    CARDIOVASCULAR:  denies pressure, squeezing, tightness, or heaviness about the chest; no palpitations.    RESPIRATORY:  denies SOB, cough, SUTTON, wheezing.    GASTROINTESTINAL:  denies abdominal pain, nausea, vomiting or diarrhea.    GENITOURINARY: denies dysuria, frequency or urgency.    NEUROLOGIC:  denies numbness, tingling, seizures or weakness.    PSYCHIATRIC:  denies disorder of thought or mood.    MSK: denies swelling, redness      PHYSICAL EXAMINATION:    GENERAL: The patient is a well-developed, well-nourished, in no apparent distress.     Vital Signs Last 24 Hrs  T(C): 36.3 (12 Mar 2018 17:24), Max: 37.1 (12 Mar 2018 05:44)  T(F): 97.3 (12 Mar 2018 17:24), Max: 98.7 (12 Mar 2018 05:44)  HR: 69 (12 Mar 2018 17:24) (60 - 80)  BP: 117/72 (12 Mar 2018 17:24) (110/71 - 125/83)  BP(mean): --  RR: 19 (12 Mar 2018 17:24) (16 - 19)  SpO2: 100% (12 Mar 2018 17:24) (96% - 100%)    HEENT: head is normocephalic and atraumatic.     NECK: supple, no palpable adenopathy.    LUNGS: clear to auscultation, no wheezing, rales, or rhonchi.    HEART: regular rate and rhythm without murmur.    ABDOMEN: soft, nontender, and nondistended.     EXTREMITIES: without any cyanosis, clubbing, rash, lesions or+ RLE edema and erythema    NEUROLOGIC: awake, alert.    SKIN: warm, dry, good turgor.      LABS:    03-12    141  |  107  |  11  ----------------------------<  96  4.2   |  28  |  0.68    Ca    8.9      12 Mar 2018 06:19  Phos  3.4     03-11  Mg     1.9     03-11            CARDIAC MARKERS ( 11 Mar 2018 15:29 )  <0.015 ng/mL / x     / 47 U/L / x     / <1.0 ng/mL  CARDIAC MARKERS ( 11 Mar 2018 03:13 )  x     / x     / 74 U/L / x     / x                    .    MICROBIOLOGY: (if applicable)    RADIOLOGY & ADDITIONAL STUDIES:  EKG:   CXR:  ECHO:   TELE:    IMPRESSION: 67y Male PAST MEDICAL & SURGICAL HISTORY:  DVT (deep venous thrombosis)  No significant past surgical history         Patient sent from [facility] for evaluation of increased size of R leg DVT, while on xarelto. Patient also found to have "mild PE" at [facility]    RECOMMENDATIONS:    admit to tele  2D echo  no acute events noted on tele  a/c for DVT

## 2018-03-11 NOTE — PROGRESS NOTE ADULT - PROBLEM SELECTOR PLAN 1
-unprovoked and failed outpatient Xarelto treatement  -failed outpatient Xarelto treatment  -c/w full dose lovenox  -F/u CTA Chest  -Hem/onc-  -Cardio-  -Pulm-  -monitor clinical status, replace electrolytes prn, monitor vitals

## 2018-03-12 LAB
ANION GAP SERPL CALC-SCNC: 6 MMOL/L — SIGNIFICANT CHANGE UP (ref 5–17)
AT III ACT/NOR PPP CHRO: 62 % — LOW (ref 85–135)
BUN SERPL-MCNC: 11 MG/DL — SIGNIFICANT CHANGE UP (ref 7–18)
CALCIUM SERPL-MCNC: 8.9 MG/DL — SIGNIFICANT CHANGE UP (ref 8.4–10.5)
CHLORIDE SERPL-SCNC: 107 MMOL/L — SIGNIFICANT CHANGE UP (ref 96–108)
CHOLEST SERPL-MCNC: 152 MG/DL — SIGNIFICANT CHANGE UP (ref 10–199)
CO2 SERPL-SCNC: 28 MMOL/L — SIGNIFICANT CHANGE UP (ref 22–31)
CREAT SERPL-MCNC: 0.68 MG/DL — SIGNIFICANT CHANGE UP (ref 0.5–1.3)
GLUCOSE SERPL-MCNC: 96 MG/DL — SIGNIFICANT CHANGE UP (ref 70–99)
HBA1C BLD-MCNC: 5.8 % — HIGH (ref 4–5.6)
HCV AB S/CO SERPL IA: 0.14 S/CO — SIGNIFICANT CHANGE UP
HCV AB SERPL-IMP: SIGNIFICANT CHANGE UP
HDLC SERPL-MCNC: 57 MG/DL — SIGNIFICANT CHANGE UP (ref 40–125)
LIPID PNL WITH DIRECT LDL SERPL: 77 MG/DL — SIGNIFICANT CHANGE UP
POTASSIUM SERPL-MCNC: 4.2 MMOL/L — SIGNIFICANT CHANGE UP (ref 3.5–5.3)
POTASSIUM SERPL-SCNC: 4.2 MMOL/L — SIGNIFICANT CHANGE UP (ref 3.5–5.3)
PROT C ACT/NOR PPP: 69 % — LOW (ref 74–150)
PROT S FREE AG PPP IA-ACNC: 90 % — SIGNIFICANT CHANGE UP (ref 67–141)
SODIUM SERPL-SCNC: 141 MMOL/L — SIGNIFICANT CHANGE UP (ref 135–145)
TOTAL CHOLESTEROL/HDL RATIO MEASUREMENT: 2.7 RATIO — LOW (ref 3.4–9.6)
TRIGL SERPL-MCNC: 92 MG/DL — SIGNIFICANT CHANGE UP (ref 10–149)
TSH SERPL-MCNC: 3.04 UU/ML — SIGNIFICANT CHANGE UP (ref 0.34–4.82)

## 2018-03-12 PROCEDURE — 74176 CT ABD & PELVIS W/O CONTRAST: CPT | Mod: 26

## 2018-03-12 PROCEDURE — 93306 TTE W/DOPPLER COMPLETE: CPT | Mod: 26

## 2018-03-12 RX ORDER — ENOXAPARIN SODIUM 100 MG/ML
80 INJECTION SUBCUTANEOUS EVERY 12 HOURS
Qty: 0 | Refills: 0 | Status: DISCONTINUED | OUTPATIENT
Start: 2018-03-12 | End: 2018-03-22

## 2018-03-12 RX ADMIN — ENOXAPARIN SODIUM 80 MILLIGRAM(S): 100 INJECTION SUBCUTANEOUS at 05:22

## 2018-03-12 RX ADMIN — PANTOPRAZOLE SODIUM 40 MILLIGRAM(S): 20 TABLET, DELAYED RELEASE ORAL at 08:36

## 2018-03-12 RX ADMIN — ENOXAPARIN SODIUM 80 MILLIGRAM(S): 100 INJECTION SUBCUTANEOUS at 18:29

## 2018-03-12 NOTE — CONSULT NOTE ADULT - SUBJECTIVE AND OBJECTIVE BOX
CHIEF COMPLAINT: Patient is a 67y old  Male who presents with a chief complaint of right lower leg pain (11 Mar 2018 04:42)      HPI:  67 M with history of DVT started having right lower leg pain 2 months ago and seen at [place] and diagnosed with DVT 3 weeks ago and started on Xarelto 15mg BID and today switched to Xarelto 20mg daily presented with right lower leg swelling getting worse. pt followed up with PCP for persistent swelling and pain and repeated doppler study outpatient which showed clot increased in size and was referred to go to ED. Patient denied fever, chest pain, palpitation, dyspnea, nausea, vomiting and any other symptoms and claimed his symptoms does not limit his ambulatory status.  In ED, doppler study with Above and below the knee thrombosis of the right lower extremity. (11 Mar 2018 04:42)   Patient seen and examined.     PAST MEDICAL & SURGICAL HISTORY:  DVT (deep venous thrombosis)  No significant past surgical history      Allergies    No Known Allergies    Intolerances        MEDICATIONS  (STANDING):  enoxaparin Injectable 80 milliGRAM(s) SubCutaneous two times a day  pantoprazole    Tablet 40 milliGRAM(s) Oral before breakfast  sodium chloride 0.9%. 1000 milliLiter(s) (100 mL/Hr) IV Continuous <Continuous>      MEDICATIONS  (PRN):  acetaminophen   Tablet. 650 milliGRAM(s) Oral every 6 hours PRN Mild Pain (1 - 3)  morphine  - Injectable 2 milliGRAM(s) IV Push every 6 hours PRN Moderate Pain (4 - 6)   Medications up to date at time of exam.    FAMILY HISTORY:  No pertinent family history in first degree relatives      SOCIAL HISTORY  Smoking History: [   ] smoking/smoke exposure, [   ] former smoker, [ x ] denies smoking  Living Condition: [   ] apartment, [   ] private house  Work History: construction  Travel History: denies recent travel  Illicit Substance Use: denies  Alcohol Use: denies    REVIEW OF SYSTEMS:    CONSTITUTIONAL:  denies fevers, chills, sweats, weight loss    HEENT:  denies diplopia or blurred vision, sore throat or runny nose.    CARDIOVASCULAR:  denies pressure, squeezing, tightness, or heaviness about the chest; no palpitations.    RESPIRATORY:  denies SOB, cough, SUTTON, wheezing.    GASTROINTESTINAL:  denies abdominal pain, nausea, vomiting or diarrhea.    GENITOURINARY: denies dysuria, frequency or urgency.    NEUROLOGIC:  denies numbness, tingling, seizures or weakness.    PSYCHIATRIC:  denies disorder of thought or mood.    MSK: denies swelling, redness      PHYSICAL EXAMINATION:    GENERAL: The patient is a well-developed, well-nourished, in no apparent distress.     Vital Signs Last 24 Hrs  T(C): 36.9 (12 Mar 2018 11:23), Max: 37.1 (12 Mar 2018 05:44)  T(F): 98.5 (12 Mar 2018 11:23), Max: 98.7 (12 Mar 2018 05:44)  HR: 80 (12 Mar 2018 11:23) (60 - 80)  BP: 112/74 (12 Mar 2018 11:23) (110/71 - 125/83)  BP(mean): --  RR: 18 (12 Mar 2018 11:23) (16 - 18)  SpO2: 98% (12 Mar 2018 11:23) (96% - 98%)   (if applicable)    Chest Tube (if applicable)    HEENT: Head is normocephalic and atraumatic. .    NECK: Supple, no palpable adenopathy.    LUNGS: Clear to auscultation, no wheezing, rales, or rhonchi.    HEART: Regular rate and rhythm without murmur.    ABDOMEN: Soft, nontender, and nondistended.  No hepatosplenomegaly is noted.    EXTREMITIES: Without any cyanosis, clubbing, rash, lesions or +R ankle edema, erythema     NEUROLOGIC: Awake, alert.    SKIN: Warm, dry, good turgor.      LABS:                        13.4   7.8   )-----------( 248      ( 10 Mar 2018 16:59 )             42.2     03-12    141  |  107  |  11  ----------------------------<  96  4.2   |  28  |  0.68    Ca    8.9      12 Mar 2018 06:19  Phos  3.4     03-11  Mg     1.9     03-11    TPro  7.3  /  Alb  3.5  /  TBili  0.3  /  DBili  x   /  AST  21  /  ALT  18  /  AlkPhos  45  03-10    PT/INR - ( 10 Mar 2018 16:59 )   PT: 18.8 sec;   INR: 1.71 ratio         PTT - ( 10 Mar 2018 16:59 )  PTT:42.2 sec      CARDIAC MARKERS ( 11 Mar 2018 15:29 )  <0.015 ng/mL / x     / 47 U/L / x     / <1.0 ng/mL  CARDIAC MARKERS ( 11 Mar 2018 03:13 )  x     / x     / 74 U/L / x     / x                    MICROBIOLOGY: (if applicable)    RADIOLOGY & ADDITIONAL STUDIES:  EKG:   CXR:  < from: Xray Chest 1 View- PORTABLE-Urgent (03.11.18 @ 09:32) >    EXAM:  XR CHEST PORTABLE URGENT 1V                            PROCEDURE DATE:  03/11/2018          INTERPRETATION:  CLINICAL INFORMATION: Abnormal chest sounds.    TECHNIQUE: Frontal view of the chest   COMPARISON: None.    FINDINGS:    LUNGS/PLEURA: No focal consolidation, pleural effusion, or pneumothorax.  MEDIASTINUM: Cardiomediastinal silhouette is unremarkable.  OTHER: Old right-sided rib fractures.    IMPRESSION:     No focal consolidation or pleural effusion.                JOSIE HAYDEN M.D., ATTENDING RADIOLOGIST  This document has been electronically signed. Mar 11 2018 11:52AM                < end of copied text >    < from: CT Angio Chest w/ IV Cont (03.11.18 @ 14:39) >    EXAM:  CT ANGIO CHEST (W)AW IC                            PROCEDURE DATE:  03/11/2018          INTERPRETATION:  CLINICAL INFORMATION: Chest pain, right lower extremity   DVT.    COMPARISON: None.    PROCEDURE:   CT Angiography of the Chest.  Intravenous contrast: 70 mL Omnipaque 350. 30 mL discarded.  Sagittal and coronal reformats were performed as well as MIPS.    FINDINGS:    LUNGS AND LARGE AIRWAYS: Patent central airways. Right basilar   subsegmental atelectasis.  PLEURA: No pleural effusion.  VESSELS: Adequate contrast opacification of the pulmonary arteries which   are visualized to the level of the segmental arteries. No pulmonary   embolism.   HEART: Heart size is normal. No pericardial effusion.  MEDIASTINUM AND RISHABH: No lymphadenopathy.  CHEST WALL AND LOWER NECK: Within normal limits.  VISUALIZED UPPER ABDOMEN: Cholelithiasis.  BONES: Degenerative changes of the spine.    IMPRESSION: No pulmonary embolism.                JOSIE HAYDEN M.D., ATTENDING RADIOLOGIST  This documenthas been electronically signed. Mar 11 2018  2:52PM                < end of copied text >        ECHO:    < from: US Duplex Venous Lower Ext Ltd, Right (03.10.18 @ 19:19) >    EXAM:  US DPLX LWR EXT VEINS LTD RT                            PROCEDURE DATE:  03/10/2018          INTERPRETATION:  CLINICAL INFORMATION: Left leg swelling, assess DVT. DVT   diagnosed 3 weeks ago.    COMPARISON: None available.    TECHNIQUE: Duplex sonography of the RIGHT LOWER extremity with color and   spectral Doppler, with and without compression.      FINDINGS: The right common femoral vein and the proximal femoral vein are   patent. There is partial thrombosis of the right mid and distal femoral   vein, popliteal vein and posterior tibial vein.     IMPRESSION:     Above and below the knee thrombosis of the right lower extremity as   described.     Discussed with Dr. Kim.                MARISA AGUILAR M.D., ATTENDING RADIOLOGIST  Thisdocument has been electronically signed. Mar 10 2018  7:29PM                < end of copied text >      IMPRESSION: 67y Male PAST MEDICAL & SURGICAL HISTORY:  DVT (deep venous thrombosis)  No significant past surgical history       Patient sent from [facility] for evaluation of increased size of R leg DVT, while on xarelto. Patient also found to have "mild PE" at [facility].    RECOMMENDATIONS:     - con't w/ lovenox   - f/u heme/onc, antiphospholipid pending   - bronchodilators prn   - DVT and GI prophylaxis.

## 2018-03-12 NOTE — PROGRESS NOTE ADULT - SUBJECTIVE AND OBJECTIVE BOX
Patient is a 67y old  Male who presents with a chief complaint of right lower leg pain (11 Mar 2018 04:42)    No Known Allergies      INTERVAL HPI /OVERNIGHT EVENTS: no acute overnight events. On encounter patient offers no new complaints. remains afebrile.    T(C): 36.9 (03-12-18 @ 11:23), Max: 37.1 (03-12-18 @ 05:44)  HR: 80 (03-12-18 @ 11:23) (60 - 80)  BP: 112/74 (03-12-18 @ 11:23) (110/71 - 125/83)  RR: 18 (03-12-18 @ 11:23) (16 - 18)  SpO2: 98% (03-12-18 @ 11:23) (96% - 98%)  I&O's Summary    Vital Signs Last 24 Hrs  T(C): 36.9 (12 Mar 2018 11:23), Max: 37.1 (12 Mar 2018 05:44)  T(F): 98.5 (12 Mar 2018 11:23), Max: 98.7 (12 Mar 2018 05:44)  HR: 80 (12 Mar 2018 11:23) (60 - 80)  BP: 112/74 (12 Mar 2018 11:23) (110/71 - 125/83)  BP(mean): --  RR: 18 (12 Mar 2018 11:23) (16 - 18)  SpO2: 98% (12 Mar 2018 11:23) (96% - 98%)  PAST MEDICAL & SURGICAL HISTORY:  DVT (deep venous thrombosis)  No significant past surgical history          LABS:    03-12    141  |  107  |  11  ----------------------------<  96  4.2   |  28  |  0.68    Ca    8.9      12 Mar 2018 06:19  Phos  3.4     03-11  Mg     1.9     03-11        CAPILLARY BLOOD GLUCOSE                MEDICATIONS  (STANDING):  enoxaparin Injectable 80 milliGRAM(s) SubCutaneous two times a day  pantoprazole    Tablet 40 milliGRAM(s) Oral before breakfast  sodium chloride 0.9%. 1000 milliLiter(s) (100 mL/Hr) IV Continuous <Continuous>    MEDICATIONS  (PRN):  acetaminophen   Tablet. 650 milliGRAM(s) Oral every 6 hours PRN Mild Pain (1 - 3)  morphine  - Injectable 2 milliGRAM(s) IV Push every 6 hours PRN Moderate Pain (4 - 6)      REVIEW OF SYSTEMS:  CONSTITUTIONAL: No fever, weight loss, or fatigue  EYES: No eye pain, visual disturbances, or discharge  ENMT:  No difficulty hearing, tinnitus, vertigo; No sinus or throat pain  NECK: No pain or stiffness  RESPIRATORY: No cough, wheezing, chills or hemoptysis; No shortness of breath  CARDIOVASCULAR: No chest pain, palpitations, dizziness, or leg swelling  GASTROINTESTINAL: No abdominal or epigastric pain. No nausea, vomiting, or hematemesis; No diarrhea or constipation. No melena or hematochezia.  GENITOURINARY: No dysuria, frequency, hematuria, or incontinence  NEUROLOGICAL: No headaches, memory loss, loss of strength, numbness, or tremors  SKIN: No itching, burning, rashes, or lesions   LYMPH NODES: No enlarged glands  ENDOCRINE: No heat or cold intolerance; No hair loss  MUSCULOSKELETAL: No joint pain or swelling; No muscle, back, or extremity pain  PSYCHIATRIC: No depression, anxiety, mood swings, or difficulty sleeping  HEME/LYMPH: No easy bruising, or bleeding gums  ALLERGY AND IMMUNOLOGIC: No hives or eczema    RADIOLOGY & ADDITIONAL TESTS:      Consultant(s) Notes Reviewed:  [ ] YES  [ ] NO    PHYSICAL EXAM:  GENERAL: NAD, well-groomed, well-developed  HEAD:  Atraumatic, Normocephalic  EYES: EOMI, PERRLA, conjunctiva and sclera clear  ENMT: No tonsillar erythema, exudates, or enlargement; Moist mucous membranes, Good dentition, No lesions  NECK: Supple, No JVD, Normal thyroid  NERVOUS SYSTEM:  Alert & Oriented X3, Good concentration; Motor Strength 5/5 B/L upper and lower extremities; DTRs 2+ intact and symmetric  CHEST/LUNG: Good air movement bilaterally; No rales, rhonchi, wheezing, or rubs  HEART: S1, S2; No murmurs, rubs, or gallops  ABDOMEN: Soft, Nontender, Nondistended; Bowel sounds present  EXTREMITIES:  2+ Peripheral Pulses, No clubbing, cyanosis, or edema  LYMPH: No lymphadenopathy noted  SKIN: No rashes or lesions    Care Collaborated Discussed with Consultants/Other Providers [ ] YES  [ ] NO Patient is a 67y old  Male who presents with a chief complaint of right lower leg pain (11 Mar 2018 04:42)    No Known Allergies      INTERVAL HPI /OVERNIGHT EVENTS: no acute overnight events. On encounter patient offers no new complaints. remains afebrile.    T(C): 36.9 (03-12-18 @ 11:23), Max: 37.1 (03-12-18 @ 05:44)  HR: 80 (03-12-18 @ 11:23) (60 - 80)  BP: 112/74 (03-12-18 @ 11:23) (110/71 - 125/83)  RR: 18 (03-12-18 @ 11:23) (16 - 18)  SpO2: 98% (03-12-18 @ 11:23) (96% - 98%)  I&O's Summary    Vital Signs Last 24 Hrs  T(C): 36.9 (12 Mar 2018 11:23), Max: 37.1 (12 Mar 2018 05:44)  T(F): 98.5 (12 Mar 2018 11:23), Max: 98.7 (12 Mar 2018 05:44)  HR: 80 (12 Mar 2018 11:23) (60 - 80)  BP: 112/74 (12 Mar 2018 11:23) (110/71 - 125/83)  BP(mean): --  RR: 18 (12 Mar 2018 11:23) (16 - 18)  SpO2: 98% (12 Mar 2018 11:23) (96% - 98%)  PAST MEDICAL & SURGICAL HISTORY:  DVT (deep venous thrombosis)  No significant past surgical history          LABS:    03-12    141  |  107  |  11  ----------------------------<  96  4.2   |  28  |  0.68    Ca    8.9      12 Mar 2018 06:19  Phos  3.4     03-11  Mg     1.9     03-11        CAPILLARY BLOOD GLUCOSE                MEDICATIONS  (STANDING):  enoxaparin Injectable 80 milliGRAM(s) SubCutaneous two times a day  pantoprazole    Tablet 40 milliGRAM(s) Oral before breakfast  sodium chloride 0.9%. 1000 milliLiter(s) (100 mL/Hr) IV Continuous <Continuous>    MEDICATIONS  (PRN):  acetaminophen   Tablet. 650 milliGRAM(s) Oral every 6 hours PRN Mild Pain (1 - 3)  morphine  - Injectable 2 milliGRAM(s) IV Push every 6 hours PRN Moderate Pain (4 - 6)      REVIEW OF SYSTEMS:  CONSTITUTIONAL: No fever.  EYES: No eye pain, or discharge  ENMT: No sinus or throat pain  NECK: No pain  RESPIRATORY: No cough, wheezing, chills or hemoptysis;  CARDIOVASCULAR: +RLE swelling. No chest pain.  GASTROINTESTINAL: No abdominal or epigastric pain. No vomiting, or hematemesis. No melena or hematochezia.  GENITOURINARY: No dysuria, frequency, hematuria, or incontinence  NEUROLOGICAL: No headaches.  SKIN: No itching, burning, rashes, or lesions   LYMPH NODES: No enlarged glands  MUSCULOSKELETAL: RLE pain.  PSYCHIATRIC: No depression, anxiety, mood swings, or difficulty sleeping  HEME/LYMPH: No easy bruising, or bleeding gums  ALLERGY AND IMMUNOLOGIC: No hives or eczema    RADIOLOGY & ADDITIONAL TESTS:  < from: US Duplex Venous Lower Ext Ltd, Right (03.10.18 @ 19:19) >  IMPRESSION:   Above and below the knee thrombosis of the right lower extremity as   described.   Discussed with Dr. iKm.    < from: Xray Chest 1 View- PORTABLE-Urgent (03.11.18 @ 09:32) >  IMPRESSION:   No focal consolidation or pleural effusion.    < from: CT Angio Chest w/ IV Cont (03.11.18 @ 14:39) >  IMPRESSION: No pulmonary embolism.    < from: CT Abdomen and Pelvis No Cont (03.12.18 @ 09:08) >  Findings:  Abdomen: Limited sections through the lung bases demonstrate small   bilateral atelectasis. Evaluation of the abdomen and pelvisis limited by   lack of IV and oral contrast.   Gallstones in the gallbladder. No CT evidence for thickened gallbladder   wall or pericholecystic fluid. The common bile duct is not dilated.   Allowing for the noncontrast technique, the liver, pancreas, spleen,   adrenals and kidneys appear grossly unremarkable. No evidence for a   calculus in the kidneys or ureters. No hydronephrosis.  The appendix appears normal. No evidence for bowel obstruction, or   grossly thickened bowel wall. Moderate amount of stool in the right   colon. No evidence for free air, ascites, or enlarged lymph node.  Pelvis: The urinary bladder appears grossly unremarkable. Sigmoid colon   and rectum are grossly intact. No pelvic free fluid or enlarged lymph   node.Mild prostatic hypertrophy at 5.2 x 3.8 x 5.7 cm.  Impression: Cholelithiasis.  No evidence for organomegaly.        Consultant(s) Notes Reviewed:  [x] YES  [ ] NO    PHYSICAL EXAM:  GENERAL: NAD, well-groomed, well-developed male in bed.   HEAD:  Atraumatic, Normocephalic  EYES: EOMI, PERRLA, conjunctiva and sclera clear  ENMT: No tonsillar erythema, exudates, or enlargement; Moist mucous membranes, Good dentition, No lesions  NECK: Supple, No JVD, Normal thyroid  NERVOUS SYSTEM:  Alert & Oriented X3, Good concentration.  CHEST/LUNG: Good air movement bilaterally.  HEART: S1, S2.  ABDOMEN: Soft, Nontender, Nondistended; Bowel sounds present  EXTREMITIES: +RLE swelling. 2+ Peripheral Pulses, No clubbing, or cyanosis.  SKIN: No rashes or lesions    Care Collaborated Discussed with Consultants/Other Providers [x] YES  [ ] NO

## 2018-03-12 NOTE — PROGRESS NOTE ADULT - SUBJECTIVE AND OBJECTIVE BOX
Patient is a 67y old  Male who presents with a chief complaint of right lower leg pain (11 Mar 2018 04:42)       Subjective:     MEDICATIONS  (STANDING):  enoxaparin Injectable 80 milliGRAM(s) SubCutaneous every 12 hours  pantoprazole    Tablet 40 milliGRAM(s) Oral before breakfast  sodium chloride 0.9%. 1000 milliLiter(s) (100 mL/Hr) IV Continuous <Continuous>    MEDICATIONS  (PRN):  acetaminophen   Tablet. 650 milliGRAM(s) Oral every 6 hours PRN Mild Pain (1 - 3)  morphine  - Injectable 2 milliGRAM(s) IV Push every 6 hours PRN Moderate Pain (4 - 6)      Allergies    No Known Allergies    Intolerances        Vital Signs Last 24 Hrs  T(C): 36.9 (12 Mar 2018 11:23), Max: 37.1 (12 Mar 2018 05:44)  T(F): 98.5 (12 Mar 2018 11:23), Max: 98.7 (12 Mar 2018 05:44)  HR: 80 (12 Mar 2018 11:23) (60 - 80)  BP: 112/74 (12 Mar 2018 11:23) (110/71 - 125/83)  BP(mean): --  RR: 18 (12 Mar 2018 11:23) (16 - 18)  SpO2: 98% (12 Mar 2018 11:23) (96% - 98%)    PHYSICAL EXAM  General: adult in NAD  HEENT: clear oropharynx, anicteric sclera, pink conjunctiva  Neck: supple  CV: normal S1/S2 with no murmur rubs or gallops  Lungs: positive air movement b/l ant lungs,clear to auscultation, no wheezes, no rales  Abdomen: soft non-tender non-distended, no hepatosplenomegaly  Ext: no clubbing cyanosis RLE edema  Skin: no rashes and no petechiae  Neuro: alert and oriented X 4, no focal deficits  LABS:          Serial CBC's  03-10 @ 16:59  Hct-42.2 / Hgb-13.4 / Plat-248 / RBC-4.16 / WBC-7.8            03-12    141  |  107  |  11  ----------------------------<  96  4.2   |  28  |  0.68    Ca    8.9      12 Mar 2018 06:19  Phos  3.4     03-11  Mg     1.9     03-11            Vitamin B12, Serum: 267 pg/mL (03-11 @ 21:39)  Folate, Serum: 5.9 ng/mL (03-11 @ 21:39)                          RADIOLOGY & ADDITIONAL STUDIES:

## 2018-03-12 NOTE — PROGRESS NOTE ADULT - PROBLEM SELECTOR PLAN 1
c/w full dose lovenox, f/u hypercoagulable w/u, f/u ct abd/pelvis  cta chest (-)  analgesics prn   monitor clinical status, replace electrolytes prn, monitor vitals

## 2018-03-12 NOTE — CONSULT NOTE ADULT - ATTENDING COMMENTS
Agree with above assessment and plan as transcribed.
Agree with above assessment and plan as transcribed

## 2018-03-12 NOTE — PROGRESS NOTE ADULT - SUBJECTIVE AND OBJECTIVE BOX
Patient seen and examined at bedside  c/o right leg pain/swelling  Case discussed with medical team    HPI:  67 M with history of DVT started having right lower leg pain 2 months ago and seen at [place] and diagnosed with DVT 3 weeks ago and started on Xarelto 15mg BID and today switched to Xarelto 20mg daily presented with right lower leg swelling getting worse. pt followed up with PCP for persistent swelling and pain and repeated doppler study outpatient which showed clot increased in size and was referred to go to ED. Patient denied fever, chest pain, palpitation, dyspnea, nausea, vomiting and any other symptoms and claimed his symptoms does not limit his ambulatory status.  In ED, doppler study with Above and below the knee thrombosis of the right lower extremity. (11 Mar 2018 04:42)      PAST MEDICAL & SURGICAL HISTORY:  DVT (deep venous thrombosis)  No significant past surgical history      No Known Allergies       MEDICATIONS  (STANDING):  enoxaparin Injectable 80 milliGRAM(s) SubCutaneous two times a day  pantoprazole    Tablet 40 milliGRAM(s) Oral before breakfast  sodium chloride 0.9%. 1000 milliLiter(s) (100 mL/Hr) IV Continuous <Continuous>    MEDICATIONS  (PRN):  acetaminophen   Tablet. 650 milliGRAM(s) Oral every 6 hours PRN Mild Pain (1 - 3)  morphine  - Injectable 2 milliGRAM(s) IV Push every 6 hours PRN Moderate Pain (4 - 6)      REVIEW OF SYSTEMS:  CONSTITUTIONAL: (+) malaise, RLE pain/swelling  EYES: No acute change in vision   ENT:  No tinnitus  NECK: No stiffness  RESPIRATORY: No hemoptysis  CARDIOVASCULAR: No chest pain, palpitations, syncope  GASTROINTESTINAL: No hematemesis, diarrhea, melena, or hematochezia.  GENITOURINARY: No hematuria  NEUROLOGICAL: No headaches  LYMPH Nodes: No enlarged glands  ENDOCRINE: No heat or cold intolerance	    T(C): 36.9 (03-12-18 @ 11:23), Max: 37.1 (03-12-18 @ 05:44)  HR: 80 (03-12-18 @ 11:23) (60 - 80)  BP: 112/74 (03-12-18 @ 11:23) (109/78 - 125/83)  RR: 18 (03-12-18 @ 11:23) (16 - 18)  SpO2: 98% (03-12-18 @ 11:23) (96% - 98%)    PHYSICAL EXAMINATION:   Constitutional: WD, NAD  HEENT: NC, AT  Neck:  Supple  Respiratory:  Adequate airflow b/l. Not using accessory muscles of respiration.  Cardiovascular:  S1 & S2 intact, no R/G, 2+ radial pulses b/l  Gastrointestinal: Soft, NT, ND, normoactive b.s., no organomegaly/RT/rigidity  Extremities: stable RLE pain/swelling, WWP  Neurological:  Alert and awake.  No acute focal motor deficits. Crude sensation intact.     Labs and imaging reviewed    LABS:                        13.4   7.8   )-----------( 248      ( 10 Mar 2018 16:59 )             42.2     03-12    141  |  107  |  11  ----------------------------<  96  4.2   |  28  |  0.68    Ca    8.9      12 Mar 2018 06:19  Phos  3.4     03-11  Mg     1.9     03-11    TPro  7.3  /  Alb  3.5  /  TBili  0.3  /  DBili  x   /  AST  21  /  ALT  18  /  AlkPhos  45  03-10    CARDIAC MARKERS ( 11 Mar 2018 15:29 )  <0.015 ng/mL / x     / 47 U/L / x     / <1.0 ng/mL  CARDIAC MARKERS ( 11 Mar 2018 03:13 )  x     / x     / 74 U/L / x     / x          PT/INR - ( 10 Mar 2018 16:59 )   PT: 18.8 sec;   INR: 1.71 ratio         PTT - ( 10 Mar 2018 16:59 )  PTT:42.2 sec    CAPILLARY BLOOD GLUCOSE            LIVER FUNCTIONS - ( 10 Mar 2018 16:59 )  Alb: 3.5 g/dL / Pro: 7.3 g/dL / ALK PHOS: 45 U/L / ALT: 18 U/L DA / AST: 21 U/L / GGT: x               RADIOLOGY & ADDITIONAL STUDIES:

## 2018-03-12 NOTE — PROGRESS NOTE ADULT - PROBLEM SELECTOR PLAN 1
from: US Duplex Venous Lower Ext Ltd, Right (03.10.18 @ 19:19 --Above and below the knee thrombosis of the right lower extremity.  -unprovoked and failed outpatient Xarelto treatement  -continue Lovenox full dose -- changed to Q12 dosing  - follow up genetic work up for coagulation factor  -Hem/onc- consulted and following -- antiphospholipid antibodies ordered for AM labs. Positive antiphospholipid antibody, if confirmed, will need life long anticoagulation. If malignancy or other local reason is ruled out , he will need life long anticoagulation  CT Abdomen and Pelvis No Cont (03.12.18 @ 09:08) Impression: Cholelithiasis.  -Cardio- -- from: CT Angio Chest w/ IV Cont (03.11.18 @ 14:39)   IMPRESSION: No pulmonary embolism.

## 2018-03-12 NOTE — PROGRESS NOTE ADULT - ASSESSMENT
imp/rec    RLE DVT apparently refractory to Xarelto. Now on Lovenox and would convert to coumadin asap. Coumadin can be monitored for compliance and therapeutic efficacy more easily than the DOAC's,   CTA was neg and CT abd/pelvis shoed only GB stones. Anticoag will likely be permanent regardless of results of thrombophilia eval as his recurrent clot risk exceeds bleeding risk  No further w/u for neoplasm is indicated.    Elevated MCV w indeterminate B12. Will check MMA.

## 2018-03-12 NOTE — PROGRESS NOTE ADULT - ASSESSMENT
67 M with history of DVT started having right lower leg pain 2 months ago and seen at [place] and diagnosed with DVT 3 weeks ago and started on Xarelto 15mg BID and today switched to Xarelto 20mg daily presented with right lower leg swelling getting worse. pt followed up with PCP for persistent swelling and pain and repeated doppler study outpatient which showed clot increased in size and was referred to go to ED. Patient denied fever, chest pain, palpitation, dyspnea, nausea, vomiting and any other symptoms and claimed his symptoms does not limit his ambulatory status.  In ED, doppler study with Above and below the knee thrombosis of the right lower extremity. 67 M with history of DVT started having right lower leg pain 2 months ago and seen at [place] and diagnosed with DVT 3 weeks ago and started on Xarelto 15mg BID and today switched to Xarelto 20mg daily presented with right lower leg swelling that is getting worse. pt followed up with PCP for persistent swelling and pain and repeated doppler study outpatient which showed clot increased in size and was referred to go to ED. Patient denied fever, chest pain, palpitation, dyspnea, nausea, vomiting and any other symptoms and claimed his symptoms does not limit his ambulatory status.  In ED, doppler study with Above and below the knee thrombosis of the right lower extremity.  Admitted for further management

## 2018-03-13 DIAGNOSIS — I49.5 SICK SINUS SYNDROME: ICD-10-CM

## 2018-03-13 DIAGNOSIS — K80.20 CALCULUS OF GALLBLADDER WITHOUT CHOLECYSTITIS WITHOUT OBSTRUCTION: ICD-10-CM

## 2018-03-13 LAB
APTT BLD: 36.9 SEC — SIGNIFICANT CHANGE UP (ref 27.5–37.4)
BASOPHILS # BLD AUTO: 0.1 K/UL — SIGNIFICANT CHANGE UP (ref 0–0.2)
BASOPHILS NFR BLD AUTO: 1.2 % — SIGNIFICANT CHANGE UP (ref 0–2)
EOSINOPHIL # BLD AUTO: 0.1 K/UL — SIGNIFICANT CHANGE UP (ref 0–0.5)
EOSINOPHIL NFR BLD AUTO: 2.1 % — SIGNIFICANT CHANGE UP (ref 0–6)
HCT VFR BLD CALC: 39.7 % — SIGNIFICANT CHANGE UP (ref 39–50)
HGB BLD-MCNC: 13.2 G/DL — SIGNIFICANT CHANGE UP (ref 13–17)
INR BLD: 1.03 RATIO — SIGNIFICANT CHANGE UP (ref 0.88–1.16)
LYMPHOCYTES # BLD AUTO: 1.4 K/UL — SIGNIFICANT CHANGE UP (ref 1–3.3)
LYMPHOCYTES # BLD AUTO: 27 % — SIGNIFICANT CHANGE UP (ref 13–44)
MCHC RBC-ENTMCNC: 33.2 GM/DL — SIGNIFICANT CHANGE UP (ref 32–36)
MCHC RBC-ENTMCNC: 33.8 PG — SIGNIFICANT CHANGE UP (ref 27–34)
MCV RBC AUTO: 102 FL — HIGH (ref 80–100)
MONOCYTES # BLD AUTO: 0.4 K/UL — SIGNIFICANT CHANGE UP (ref 0–0.9)
MONOCYTES NFR BLD AUTO: 8.5 % — SIGNIFICANT CHANGE UP (ref 2–14)
NEUTROPHILS # BLD AUTO: 3.2 K/UL — SIGNIFICANT CHANGE UP (ref 1.8–7.4)
NEUTROPHILS NFR BLD AUTO: 61.2 % — SIGNIFICANT CHANGE UP (ref 43–77)
PLATELET # BLD AUTO: 205 K/UL — SIGNIFICANT CHANGE UP (ref 150–400)
PROTHROM AB SERPL-ACNC: 11.2 SEC — SIGNIFICANT CHANGE UP (ref 9.8–12.7)
RBC # BLD: 3.89 M/UL — LOW (ref 4.2–5.8)
RBC # FLD: 12.7 % — SIGNIFICANT CHANGE UP (ref 10.3–14.5)
WBC # BLD: 5.3 K/UL — SIGNIFICANT CHANGE UP (ref 3.8–10.5)
WBC # FLD AUTO: 5.3 K/UL — SIGNIFICANT CHANGE UP (ref 3.8–10.5)

## 2018-03-13 RX ORDER — WARFARIN SODIUM 2.5 MG/1
5 TABLET ORAL ONCE
Qty: 0 | Refills: 0 | Status: COMPLETED | OUTPATIENT
Start: 2018-03-13 | End: 2018-03-13

## 2018-03-13 RX ADMIN — ENOXAPARIN SODIUM 80 MILLIGRAM(S): 100 INJECTION SUBCUTANEOUS at 06:28

## 2018-03-13 RX ADMIN — ENOXAPARIN SODIUM 80 MILLIGRAM(S): 100 INJECTION SUBCUTANEOUS at 17:43

## 2018-03-13 RX ADMIN — WARFARIN SODIUM 5 MILLIGRAM(S): 2.5 TABLET ORAL at 21:43

## 2018-03-13 RX ADMIN — PANTOPRAZOLE SODIUM 40 MILLIGRAM(S): 20 TABLET, DELAYED RELEASE ORAL at 06:28

## 2018-03-13 NOTE — PROGRESS NOTE ADULT - ASSESSMENT
67 M with history of DVT on xarelto p/w extensive RLE DVT failed outpatient xarelto.    Admitted to the floor for New DVT after failing treatment

## 2018-03-13 NOTE — PROGRESS NOTE ADULT - SUBJECTIVE AND OBJECTIVE BOX
Patient is a 67y old  Male who presents with persistent DVT despite full dose xarelto  Now on Lovenox and improving.  Started first dose of coumadin 5 mg today      MEDICATIONS  (STANDING):  enoxaparin Injectable 80 milliGRAM(s) SubCutaneous every 12 hours  pantoprazole    Tablet 40 milliGRAM(s) Oral before breakfast  sodium chloride 0.9%. 1000 milliLiter(s) (100 mL/Hr) IV Continuous <Continuous>  warfarin 5 milliGRAM(s) Oral once    MEDICATIONS  (PRN):  acetaminophen   Tablet. 650 milliGRAM(s) Oral every 6 hours PRN Mild Pain (1 - 3)  morphine  - Injectable 2 milliGRAM(s) IV Push every 6 hours PRN Moderate Pain (4 - 6)      Allergies    No Known Allergies    Intolerances        Vital Signs Last 24 Hrs  T(C): 36.9 (13 Mar 2018 15:20), Max: 37.1 (13 Mar 2018 11:28)  T(F): 98.5 (13 Mar 2018 15:20), Max: 98.8 (13 Mar 2018 11:28)  HR: 71 (13 Mar 2018 15:20) (61 - 78)  BP: 109/72 (13 Mar 2018 15:20) (109/72 - 123/68)  BP(mean): --  RR: 16 (13 Mar 2018 15:20) (16 - 19)  SpO2: 93% (13 Mar 2018 15:20) (93% - 97%)    PHYSICAL EXAM  General: adult in NAD  HEENT: clear oropharynx, anicteric sclera, pink conjunctiva  Neck: supple  CV: normal S1/S2 with no murmur rubs or gallops  Lungs: positive air movement b/l ant lungs,clear to auscultation, no wheezes, no rales  Abdomen: soft non-tender non-distended, no hepatosplenomegaly  Ext: no clubbing cyanosis or edema  Skin: right leg swelling is better, non tender  Neuro: alert and oriented X 4, no focal deficits      LABS:                          13.2   5.3   )-----------( 205      ( 13 Mar 2018 12:41 )             39.7         Mean Cell Volume : 102.0 fl  Mean Cell Hemoglobin : 33.8 pg  Mean Cell Hemoglobin Concentration : 33.2 gm/dL  Auto Neutrophil # : 3.2 K/uL  Auto Lymphocyte # : 1.4 K/uL  Auto Monocyte # : 0.4 K/uL  Auto Eosinophil # : 0.1 K/uL  Auto Basophil # : 0.1 K/uL  Auto Neutrophil % : 61.2 %  Auto Lymphocyte % : 27.0 %  Auto Monocyte % : 8.5 %  Auto Eosinophil % : 2.1 %  Auto Basophil % : 1.2 %      Serial CBC's  03-13 @ 12:41  Hct-39.7 / Hgb-13.2 / Plat-205 / RBC-3.89 / WBC-5.3  Serial CBC's  03-10 @ 16:59  Hct-42.2 / Hgb-13.4 / Plat-248 / RBC-4.16 / WBC-7.8      03-12    141  |  107  |  11  ----------------------------<  96  4.2   |  28  |  0.68    Ca    8.9      12 Mar 2018 06:19        PT/INR - ( 13 Mar 2018 12:41 )   PT: 11.2 sec;   INR: 1.03 ratio         PTT - ( 13 Mar 2018 12:41 )  PTT:36.9 sec    Vitamin B12, Serum: 267 pg/mL (03-11 @ 21:39)  Folate, Serum: 5.9 ng/mL (03-11 @ 21:39)

## 2018-03-13 NOTE — CONSULT NOTE ADULT - SUBJECTIVE AND OBJECTIVE BOX
EP ATTENDING      HISTORY OF PRESENT ILLNESS: He is a pleasant 66 y/o male PMH recent diagnosis of DVT. EP is now called because telemetry shows periods of both tachycardia and bradycardia. All rhythms are sinus. Echo/CTPA/TSH all unremarkable. He denies palpitations, syncope, nor angina.    PAST MEDICAL & SURGICAL HISTORY:  DVT (deep venous thrombosis)  No significant past surgical history    MEDICATIONS  (STANDING):  enoxaparin Injectable 80 milliGRAM(s) SubCutaneous every 12 hours  pantoprazole    Tablet 40 milliGRAM(s) Oral before breakfast  sodium chloride 0.9%. 1000 milliLiter(s) (100 mL/Hr) IV Continuous <Continuous>  warfarin 5 milliGRAM(s) Oral once    No Known Allergies    FAMILY HISTORY:  No pertinent family history in first degree relatives  Non-contributary for premature coronary disease or sudden cardiac death    SOCIAL HISTORY:    [x ] Non-smoker  [ ] Smoker  [ ] Alcohol      REVIEW OF SYSTEMS:  [ ]chest pain  [  ]shortness of breath  [  ]palpitations  [  ]syncope  [ ]near syncope [ ]upper extremity weakness   [ ] lower extremity weakness  [  ]diplopia  [  ]altered mental status   [  ]fevers  [ ]chills [ ]nausea  [ ]vomitting  [  ]dysphagia    [ ]abdominal pain  [ ]melena  [ ]BRBPR    [  ]epistaxis  [  ]rash    [x ]lower extremity edema        [ x] All others negative	  [ ] Unable to obtain    PHYSICAL EXAM:  T(C): 37.1 (03-13-18 @ 11:28), Max: 37.1 (03-13-18 @ 11:28)  HR: 73 (03-13-18 @ 11:28) (61 - 78)  BP: 117/70 (03-13-18 @ 11:28) (117/70 - 123/68)  RR: 18 (03-13-18 @ 11:28) (16 - 19)  SpO2: 95% (03-13-18 @ 11:28) (95% - 100%)  Wt(kg): --    no JVD  RRR, no murmurs  CTAB  soft nt/nd  no c/c/e    TELEMETRY: 	  as above  ECG:  	NSR, normal EKG    Echo: unremarkable  NST: n/a  Cath: n/a  	  	  LABS:	 	                          13.2   5.3   )-----------( 205      ( 13 Mar 2018 12:41 )             39.7     03-12    141  |  107  |  11  ----------------------------<  96  4.2   |  28  |  0.68    Ca    8.9      12 Mar 2018 06:19      proBNP:   Lipid Profile:   HgA1c:   TSH:     A/P) He is a pleasant 66 y/o male PMH recent diagnosis of DVT. EP is now called because telemetry shows periods of both tachycardia and bradycardia. All rhythms are sinus. Echo/CTPA/TSH all unremarkable. He denies palpitations, syncope, nor angina.    -considering the rhythms are all sinus and the non-invasive workup (echo, TSH, CTPA) are normal, then there is no further EP workup needed  -A/C for DVT as per primary team  -d/c tele      Helen Sorensen M.D., [facility]  Cardiac Electrophysiology  [facility]  [address] [place] [address]  [email or web address]    office [phone]  pager [phone]

## 2018-03-13 NOTE — PROGRESS NOTE ADULT - PROBLEM SELECTOR PLAN 1
Patient failed Xarelto  Coumadin 5 mg po qhs and full dose Lovenox,  Unremarkable CT Abd/pelvis  Heme/Onc Dr Sheppard

## 2018-03-13 NOTE — PROGRESS NOTE ADULT - SUBJECTIVE AND OBJECTIVE BOX
Time of Visit:  Patient seen and examined.     MEDICATIONS  (STANDING):  enoxaparin Injectable 80 milliGRAM(s) SubCutaneous every 12 hours  pantoprazole    Tablet 40 milliGRAM(s) Oral before breakfast  sodium chloride 0.9%. 1000 milliLiter(s) (100 mL/Hr) IV Continuous <Continuous>  warfarin 5 milliGRAM(s) Oral once      MEDICATIONS  (PRN):  acetaminophen   Tablet. 650 milliGRAM(s) Oral every 6 hours PRN Mild Pain (1 - 3)  morphine  - Injectable 2 milliGRAM(s) IV Push every 6 hours PRN Moderate Pain (4 - 6)       Medications up to date at time of exam.    ROS: No fever, chills, cough, congestion, SOB.  PHYSICAL EXAMINATION:       Vital Signs Last 24 Hrs  T(C): 36.9 (13 Mar 2018 15:20), Max: 37.1 (13 Mar 2018 11:28)  T(F): 98.5 (13 Mar 2018 15:20), Max: 98.8 (13 Mar 2018 11:28)  HR: 71 (13 Mar 2018 15:20) (61 - 78)  BP: 109/72 (13 Mar 2018 15:20) (109/72 - 123/68)  BP(mean): --  RR: 16 (13 Mar 2018 15:20) (16 - 19)  SpO2: 93% (13 Mar 2018 15:20) (93% - 100%)   (if applicable)    General; Alert and oriented. No acute distress.     HEENT: Normocephalic and atraumatic. Extraocular muscles are intact. Moist mucosa.     NECK: Supple, no palpable adenopathy.    LUNGS: Clear to auscultation, no wheezing, rales, or rhonchi. No kaarn of accessory muscle.    HEART: S1 S2 Regular rate and no click/ rub.    ABDOMEN: Soft, nontender, and nondistended.  No hepatosplenomegaly is noted. Active bowel sounds.    EXTREMITIES: Without any cyanosis, clubbing, rash, lesions . Right ankle edema.    NEUROLOGIC: Awake, alert, oriented.     SKIN: Warm and moist. Non diaphoretic.      LABS:                        13.2   5.3   )-----------( 205      ( 13 Mar 2018 12:41 )             39.7     03-12    141  |  107  |  11  ----------------------------<  96  4.2   |  28  |  0.68    Ca    8.9      12 Mar 2018 06:19      PT/INR - ( 13 Mar 2018 12:41 )   PT: 11.2 sec;   INR: 1.03 ratio         PTT - ( 13 Mar 2018 12:41 )  PTT:36.9 sec      CARDIAC MARKERS ( 11 Mar 2018 15:29 )  <0.015 ng/mL / x     / 47 U/L / x     / <1.0 ng/mL    RADIOLOGY & ADDITIONAL STUDIES:  EKG:   CXR: < from: Xray Chest 1 View- PORTABLE-Urgent (03.11.18 @ 09:32) >  PROCEDURE DATE:  03/11/2018          INTERPRETATION:  CLINICAL INFORMATION: Abnormal chest sounds.    TECHNIQUE: Frontal view of the chest   COMPARISON: None.    FINDINGS:    LUNGS/PLEURA: No focal consolidation, pleural effusion, or pneumothorax.  MEDIASTINUM: Cardiomediastinal silhouette is unremarkable.  OTHER: Old right-sided rib fractures.    IMPRESSION:     No focal consolidation or pleural effusion.        PAST MEDICAL & SURGICAL HISTORY:  DVT (deep venous thrombosis)  No significant past surgical history    Impression; 66 Y/O Male was sent from [facility]D for evaluation of increased size of R leg DVT, while on xarelto. Patient also found to have "mild PE" at [facility]. O2 saturation 96% room air on exam.    Suggestion:  Oxygen supplementation if needed to keep O2 saturation >90%, O 2saturation 94% room air.   Continue Lovenox 80 mg SQ and INR 1.3 Coumadin 5 mg.  GI prophylaxis.   No need for respiratory Tx at this time.

## 2018-03-13 NOTE — PROGRESS NOTE ADULT - SUBJECTIVE AND OBJECTIVE BOX
==================PGY 1 Note===================   Discussed with supervising resident and primary attending    ================CHIEF COMPLAINT===============  Patient is a 67y old  Male who presents with a chief complaint of right lower leg pain (11 Mar 2018 04:42)        =========INTERVAL HPI/OVERNIGHT EVENTS=========  Offers no new complaints; current symptoms resolving      ============CURRENT MEDICATIONS===============    MEDICATIONS  (STANDING):  enoxaparin Injectable 80 milliGRAM(s) SubCutaneous every 12 hours  pantoprazole    Tablet 40 milliGRAM(s) Oral before breakfast  sodium chloride 0.9%. 1000 milliLiter(s) (100 mL/Hr) IV Continuous <Continuous>  warfarin 5 milliGRAM(s) Oral once    MEDICATIONS  (PRN):  acetaminophen   Tablet. 650 milliGRAM(s) Oral every 6 hours PRN Mild Pain (1 - 3)  morphine  - Injectable 2 milliGRAM(s) IV Push every 6 hours PRN Moderate Pain (4 - 6)        ============REVIEW OF SYSTEMS==================    CONSTITUTIONAL: No fever  EYES: no acute visual disturbances  NECK: No pain or stiffness  RESPIRATORY: No cough; No shortness of breath  CARDIOVASCULAR: No chest pain, no palpitations  GASTROINTESTINAL: No pain. No nausea or vomiting; No diarrhea   NEUROLOGICAL: No headache or numbness, no tremors  MUSCULOSKELETAL: No joint pain, no muscle pain  GENITOURINARY: no dysuria, no frequency, no hesitancy  PSYCHIATRY: no depression , no anxiety  ALL OTHER  ROS negative      ================VITALS SIGNS=====================  Vital Signs Last 24 Hrs  T(C): 37.1 (13 Mar 2018 11:28), Max: 37.1 (13 Mar 2018 11:28)  T(F): 98.8 (13 Mar 2018 11:28), Max: 98.8 (13 Mar 2018 11:28)  HR: 73 (13 Mar 2018 11:28) (61 - 78)  BP: 117/70 (13 Mar 2018 11:28) (117/70 - 123/68)  BP(mean): --  RR: 18 (13 Mar 2018 11:28) (16 - 19)  SpO2: 95% (13 Mar 2018 11:28) (95% - 100%)    ===============PHYSICAL EXAM====================    GENERAL: NAD  HEENT: Normocephalic;  conjunctivae and sclerae clear; moist mucous membranes;   NECK : supple  CHEST/LUNG: Clear to auscultation bilaterally with good air entry   HEART: S1 S2  regular; no murmurs, gallops or rubs  ABDOMEN: Soft, Nontender, Nondistended; Bowel sounds present  EXTREMITIES: no cyanosis; no edema; no calf tenderness  SKIN: warm and dry; no rash  NERVOUS SYSTEM:  Awake and alert; Oriented  to place, person and time ; no new deficits    ==============LABORATORIES======================  LABS:    03-12    141  |  107  |  11  ----------------------------<  96  4.2   |  28  |  0.68    Ca    8.9      12 Mar 2018 06:19          CAPILLARY BLOOD GLUCOSE          =============INPUTS/OUPUTS=====================    03-13-18 @ 07:01  -  03-13-18 @ 12:24  --------------------------------------------------------  IN: 118 mL / OUT: 0 mL / NET: 118 mL          RADIOLOGY & ADDITIONAL TESTS:    Imaging Personally Reviewed:  YES    Consultant(s) Notes Reviewed:   YES    Care Discussed with Consultants : YES    Plan of care was discussed with patient  and /or primary care giver; all questions and concerns were addressed and care was aligned with patient's wishes. Time was allowed for questions that were answered to the best of my abilities

## 2018-03-13 NOTE — PROGRESS NOTE ADULT - SUBJECTIVE AND OBJECTIVE BOX
Patient seen and examined.       MEDICATIONS  (STANDING):  enoxaparin Injectable 80 milliGRAM(s) SubCutaneous every 12 hours  pantoprazole    Tablet 40 milliGRAM(s) Oral before breakfast  sodium chloride 0.9%. 1000 milliLiter(s) (100 mL/Hr) IV Continuous <Continuous>  warfarin 5 milliGRAM(s) Oral once      MEDICATIONS  (PRN):  acetaminophen   Tablet. 650 milliGRAM(s) Oral every 6 hours PRN Mild Pain (1 - 3)  morphine  - Injectable 2 milliGRAM(s) IV Push every 6 hours PRN Moderate Pain (4 - 6)   Medications up to date at time of exam.      PHYSICAL EXAMINATION:  Patient has no new complaints.  GENERAL: The patient is a well-developed, well-nourished, in no apparent distress.     Vital Signs Last 24 Hrs  T(C): 37.1 (13 Mar 2018 11:28), Max: 37.1 (13 Mar 2018 11:28)  T(F): 98.8 (13 Mar 2018 11:28), Max: 98.8 (13 Mar 2018 11:28)  HR: 73 (13 Mar 2018 11:28) (61 - 78)  BP: 117/70 (13 Mar 2018 11:28) (117/70 - 123/68)  BP(mean): --  RR: 18 (13 Mar 2018 11:28) (16 - 19)  SpO2: 95% (13 Mar 2018 11:28) (95% - 100%)   (if applicable)      HEENT: Head is normocephalic and atraumatic.     NECK: Supple, no palpable adenopathy.    LUNGS: Clear to auscultation, no wheezing, rales, or rhonchi.    HEART: Regular rate and rhythm without murmur.    ABDOMEN: Soft, nontender, and nondistended.  No hepatosplenomegaly is noted.    EXTREMITIES: Without any cyanosis, clubbing, rash, lesions or RLE edema improving    NEUROLOGIC: Awake, alert.    SKIN: Warm, dry, good turgor.      LABS:    03-12    141  |  107  |  11  ----------------------------<  96  4.2   |  28  |  0.68    Ca    8.9      12 Mar 2018 06:19  Phos  3.4     03-11  Mg     1.9     03-11            CARDIAC MARKERS ( 11 Mar 2018 15:29 )  <0.015 ng/mL / x     / 47 U/L / x     / <1.0 ng/mL                MICROBIOLOGY: (if applicable)    RADIOLOGY & ADDITIONAL STUDIES:  EKG:   CXR:  ECHO:    < from: Transthoracic Echocardiogram (03.12.18 @ 07:23) >    Patient name: HORACIO STEPHENSON  YOB: 1950   Age: 67 (M)   MR#: [id number]  Study Date: 3/12/2018  Location: [address] [place], [place] [address]onographer: Rebecca Hidalgo [facility]  Study quality: Fair  Referring Physician:  GENI BEE MD  Blood Pressure:119/80 mmHg  Height: 175 cm  Weight: 78 kg  BSA: 1.9 m2  ------------------------------------------------------------------------    PROCEDURE: Transthoracic echocardiogram with 2-D, M-Mode  and complete spectral and color flow Doppler.  INDICATION: Chest pain, unspecified (R07.9)  HISTORY:  ------------------------------------------------------------------------  DIMENSIONS:  Dimensions:     Normal Values:  LA:     4.0 cm    2.0 - 4.0 cm  Ao:     4.3 cm    2.0 - 3.8 cm  SEPTUM: 1.1 cm    0.6 - 1.2 cm  PWT:    1.0 cm    0.6 - 1.1 cm  LVIDd:  5.2 cm    3.0 - 5.6 cm  LVIDs:  3.5 cm    1.8 - 4.0 cm      Derived Variables:  LVMI: 107 g/m2  RWT: 0.38    ------------------------------------------------------------------------  OBSERVATIONS:  MitralValve: Normal mitral valve. Trace mitral  regurgitation.  Aortic Root: Normal aortic root.  Aortic Valve: Probably trileaflet aortic valve is not well  seen. No aortic stenosis. No aortic valve regurgitation  seen.  Left Atrium: Normal left atrium.  LA volume index = 29  cc/m2.  Left Ventricle: Low-nomal Left Ventricular Systolic  Function,  (EF = 51% by biplane) Mild left ventricular  enlargement. Normal diastolic function.  Right Heart: Normal right atrium. Normal right ventricular  size and systolic function (TAPSE 2.3 cm). Normal tricuspid  valve. Pulmonic valve not well seen. Trace pulmonic  insufficiency is noted.  Pericardium/PleuraNo pericardial effusion.  Hemodynamic: Insufficient tricuspid regurgitation jet to  allow calculation of RVSP.  ------------------------------------------------------------------------  CONCLUSIONS:  1. Normal mitral valve. Trace mitral regurgitation.  2. Probably trileaflet aortic valve is not well seen. No  aortic stenosis. No aortic valve regurgitation seen.  3. Normal aortic root.  4. Normal left atrium.  5. Mild left ventricular enlargement.  6. Low-nomal Left Ventricular Systolic Function,  (EF = 51%  by biplane)  7. Normal diastolic function.  8. Normal right atrium.  9. Normal right ventricular size and systolic function  (TAPSE 2.3 cm).  10. Normal tricuspid valve.  11. Pulmonic valve not well seen. Trace pulmonic  insufficiency is noted.  12. No pericardial effusion.    *** No previous Echo exam.  ------------------------------------------------------------------------  Confirmed on  3/13/2018 - 10:14:31 by Attila Coker MD  ------------------------------------------------------------------------    < end of copied text >    IMPRESSION: 67y Male PAST MEDICAL & SURGICAL HISTORY:  DVT (deep venous thrombosis)  No significant past surgical history       Patient sent from PMD for evaluation of increased size of R leg DVT, while on xarelto.     RECOMMENDATIONS:    2D echo shows normal LV function  Patient noted with episodes of ST and SB, he denies having any palpitations or dizziness, EP consulted.  BP appears well controlled  DVT and GI prophylaxis.

## 2018-03-13 NOTE — ED ADULT NURSE REASSESSMENT NOTE - NS ED NURSE REASSESS COMMENT FT1
Patient remains hemodynamically stable and in no acute distress, able to speak in full sentences , breathe comfortably in room air . Patient is able to walk with a steady gait. Patient verbalizes no complaints.
Pt. is in stable condition at this time. Pt. resting comfortably in bed, breathing WDL. No complaints voiced of pain. No signs of acute distress noted. Pt. awaiting bed. Pt. is being continuously monitored by cardiac monitor. will continue to monitor closely.
Pt received axox3 no c/o pain no distress noted, ed observation continues.

## 2018-03-13 NOTE — PROGRESS NOTE ADULT - SUBJECTIVE AND OBJECTIVE BOX
Patient seen and examined at bedside  endorses mildly improving rle pain/swelling, otherwise no acute events noted overnight  Case discussed with medical team    HPI:  67 M with history of DVT started having right lower leg pain 2 months ago and seen at [facility] and diagnosed with DVT 3 weeks ago and started on Xarelto 15mg BID and today switched to Xarelto 20mg daily presented with right lower leg swelling getting worse. pt followed up with PCP for persistent swelling and pain and repeated doppler study outpatient which showed clot increased in size and was referred to go to ED. Patient denied fever, chest pain, palpitation, dyspnea, nausea, vomiting and any other symptoms and claimed his symptoms does not limit his ambulatory status.  In ED, doppler study with Above and below the knee thrombosis of the right lower extremity. (11 Mar 2018 04:42)      PAST MEDICAL & SURGICAL HISTORY:  DVT (deep venous thrombosis)  No significant past surgical history      No Known Allergies       MEDICATIONS  (STANDING):  enoxaparin Injectable 80 milliGRAM(s) SubCutaneous every 12 hours  pantoprazole    Tablet 40 milliGRAM(s) Oral before breakfast  sodium chloride 0.9%. 1000 milliLiter(s) (100 mL/Hr) IV Continuous <Continuous>  warfarin 5 milliGRAM(s) Oral once    MEDICATIONS  (PRN):  acetaminophen   Tablet. 650 milliGRAM(s) Oral every 6 hours PRN Mild Pain (1 - 3)  morphine  - Injectable 2 milliGRAM(s) IV Push every 6 hours PRN Moderate Pain (4 - 6)      REVIEW OF SYSTEMS:  CONSTITUTIONAL: (+) malaise.   EYES: No acute change in vision   ENT:  No tinnitus  NECK: No stiffness  RESPIRATORY: No hemoptysis  CARDIOVASCULAR: No chest pain, palpitations, syncope  GASTROINTESTINAL: No hematemesis, diarrhea, melena, or hematochezia.  GENITOURINARY: No hematuria  NEUROLOGICAL: No headaches  LYMPH Nodes: No enlarged glands  ENDOCRINE: No heat or cold intolerance	    T(C): 36.7 (03-13-18 @ 07:51), Max: 36.9 (03-12-18 @ 11:23)  HR: 61 (03-13-18 @ 07:51) (61 - 80)  BP: 122/69 (03-13-18 @ 07:51) (112/74 - 123/68)  RR: 16 (03-13-18 @ 07:51) (16 - 19)  SpO2: 95% (03-13-18 @ 07:51) (95% - 100%)    PHYSICAL EXAMINATION:   Constitutional: WD, NAD  HEENT: NC, AT  Neck:  Supple  Respiratory:  Adequate airflow b/l. Not using accessory muscles of respiration.  Cardiovascular:  S1 & S2 intact, no R/G, 2+ radial pulses b/l  Gastrointestinal: Soft, NT, ND, normoactive b.s., no organomegaly/RT/rigidity  Extremities: rle edema, WWP  Neurological:  Alert and awake.  No acute focal motor deficits. Crude sensation intact.     Labs and imaging reviewed    LABS:    03-12    141  |  107  |  11  ----------------------------<  96  4.2   |  28  |  0.68    Ca    8.9      12 Mar 2018 06:19  Phos  3.4     03-11  Mg     1.9     03-11      CARDIAC MARKERS ( 11 Mar 2018 15:29 )  <0.015 ng/mL / x     / 47 U/L / x     / <1.0 ng/mL          CAPILLARY BLOOD GLUCOSE                  RADIOLOGY & ADDITIONAL STUDIES:  < from: Transthoracic Echocardiogram (03.12.18 @ 07:23) >    Patient name: HORACIO STEPHENSON  YOB: 1950   Age: 67 (M)   MR#: [id number]  Study Date: 3/12/2018  Location: [address] [place], [place] [address]onographer: Rebecca Hidalgo [facility]  Study quality: Fair  Referring Physician:  GENI BEE MD  Blood Pressure:119/80 mmHg  Height: 175 cm  Weight: 78 kg  BSA: 1.9 m2  ------------------------------------------------------------------------    PROCEDURE: Transthoracic echocardiogram with 2-D, M-Mode  and complete spectral and color flow Doppler.  INDICATION: Chest pain, unspecified (R07.9)  HISTORY:  ------------------------------------------------------------------------  DIMENSIONS:  Dimensions:     Normal Values:  LA:     4.0 cm    2.0 - 4.0 cm  Ao:     4.3 cm    2.0 - 3.8 cm  SEPTUM: 1.1 cm    0.6 - 1.2 cm  PWT:    1.0 cm    0.6 - 1.1 cm  LVIDd:  5.2 cm    3.0 - 5.6 cm  LVIDs:  3.5 cm    1.8 - 4.0 cm      Derived Variables:  LVMI: 107 g/m2  RWT: 0.38    ------------------------------------------------------------------------  OBSERVATIONS:  MitralValve: Normal mitral valve. Trace mitral  regurgitation.  Aortic Root: Normal aortic root.  Aortic Valve: Probably trileaflet aortic valve is not well  seen. No aortic stenosis. No aortic valve regurgitation  seen.  Left Atrium: Normal left atrium.  LA volume index = 29  cc/m2.  Left Ventricle: Low-nomal Left Ventricular Systolic  Function,  (EF = 51% by biplane) Mild left ventricular  enlargement. Normal diastolic function.  Right Heart: Normal right atrium. Normal right ventricular  size and systolic function (TAPSE 2.3 cm). Normal tricuspid  valve. Pulmonic valve not well seen. Trace pulmonic  insufficiency is noted.  Pericardium/PleuraNo pericardial effusion.  Hemodynamic: Insufficient tricuspid regurgitation jet to  allow calculation of RVSP.  ------------------------------------------------------------------------  CONCLUSIONS:  1. Normal mitral valve. Trace mitral regurgitation.  2. Probably trileaflet aortic valve is not well seen. No  aortic stenosis. No aortic valve regurgitation seen.  3. Normal aortic root.  4. Normal left atrium.  5. Mild left ventricular enlargement.  6. Low-nomal Left Ventricular Systolic Function,  (EF = 51%  by biplane)  7. Normal diastolic function.  8. Normal right atrium.  9. Normal right ventricular size and systolic function  (TAPSE 2.3 cm).  10. Normal tricuspid valve.  11. Pulmonic valve not well seen. Trace pulmonic  insufficiency is noted.  12. No pericardial effusion.    *** No previous Echo exam.    < end of copied text >

## 2018-03-13 NOTE — PROGRESS NOTE ADULT - ASSESSMENT
VENOUS THROMBOPHILIA. Refractory to DOAC  Now on Lovenox and coumadin  He should remain on Lovenox until the therapeutic INR is achieved ( 2-3) on Coumadin. And should be continued on Lovenox for another 48 hours after the therapeutic INR  It would be convenient and better if he stays in house till Lovenox is no longer needed.  Unless there can be an arrangement to administer both Lovenox and Coumadin during this bridging period. In that case Lovenox dose may be changed to once a day ( 1.5 mg/kg sq daily)    Hypercoag workup at this time ( some results back) is most likely erroneous due to DOAC and lovenox on board, and being active DVT. Only immunologic assays could be considered reliable.  Any way , it would not change the overall treatment plan, He needs long term anticoagulation.       Above discussed in detail with the patient.    HIGH MCV AND LOW NORMAL VITAMIN B 12  Await methyl malonic acid level	    Nasir Gondal  [phone]

## 2018-03-13 NOTE — PROGRESS NOTE ADULT - PROBLEM SELECTOR PLAN 1
coumadin 5 mg po qhs and full dose lovenox,  unremarkable ct abd/pelvis  analgesics prn   monitor inr, monitor clinical status, replace electrolytes prn, monitor vitals

## 2018-03-14 LAB
ANION GAP SERPL CALC-SCNC: 5 MMOL/L — SIGNIFICANT CHANGE UP (ref 5–17)
APTT BLD: 39.2 SEC — HIGH (ref 27.5–37.4)
BUN SERPL-MCNC: 13 MG/DL — SIGNIFICANT CHANGE UP (ref 7–18)
CALCIUM SERPL-MCNC: 8.7 MG/DL — SIGNIFICANT CHANGE UP (ref 8.4–10.5)
CHLORIDE SERPL-SCNC: 108 MMOL/L — SIGNIFICANT CHANGE UP (ref 96–108)
CO2 SERPL-SCNC: 27 MMOL/L — SIGNIFICANT CHANGE UP (ref 22–31)
CREAT SERPL-MCNC: 0.72 MG/DL — SIGNIFICANT CHANGE UP (ref 0.5–1.3)
GLUCOSE SERPL-MCNC: 93 MG/DL — SIGNIFICANT CHANGE UP (ref 70–99)
INR BLD: 1.06 RATIO — SIGNIFICANT CHANGE UP (ref 0.88–1.16)
POTASSIUM SERPL-MCNC: 4.3 MMOL/L — SIGNIFICANT CHANGE UP (ref 3.5–5.3)
POTASSIUM SERPL-SCNC: 4.3 MMOL/L — SIGNIFICANT CHANGE UP (ref 3.5–5.3)
PROTHROM AB SERPL-ACNC: 11.6 SEC — SIGNIFICANT CHANGE UP (ref 9.8–12.7)
SODIUM SERPL-SCNC: 140 MMOL/L — SIGNIFICANT CHANGE UP (ref 135–145)

## 2018-03-14 RX ORDER — WARFARIN SODIUM 2.5 MG/1
5 TABLET ORAL ONCE
Qty: 0 | Refills: 0 | Status: COMPLETED | OUTPATIENT
Start: 2018-03-14 | End: 2018-03-14

## 2018-03-14 RX ADMIN — ENOXAPARIN SODIUM 80 MILLIGRAM(S): 100 INJECTION SUBCUTANEOUS at 06:03

## 2018-03-14 RX ADMIN — ENOXAPARIN SODIUM 80 MILLIGRAM(S): 100 INJECTION SUBCUTANEOUS at 17:54

## 2018-03-14 RX ADMIN — WARFARIN SODIUM 5 MILLIGRAM(S): 2.5 TABLET ORAL at 22:22

## 2018-03-14 RX ADMIN — PANTOPRAZOLE SODIUM 40 MILLIGRAM(S): 20 TABLET, DELAYED RELEASE ORAL at 06:03

## 2018-03-14 NOTE — PROGRESS NOTE ADULT - SUBJECTIVE AND OBJECTIVE BOX
Patient seen and examined.     MEDICATIONS  (STANDING):  enoxaparin Injectable 80 milliGRAM(s) SubCutaneous every 12 hours  pantoprazole    Tablet 40 milliGRAM(s) Oral before breakfast  sodium chloride 0.9%. 1000 milliLiter(s) (100 mL/Hr) IV Continuous <Continuous>  warfarin 5 milliGRAM(s) Oral once      MEDICATIONS  (PRN):  acetaminophen   Tablet. 650 milliGRAM(s) Oral every 6 hours PRN Mild Pain (1 - 3)  morphine  - Injectable 2 milliGRAM(s) IV Push every 6 hours PRN Moderate Pain (4 - 6)     Medications up to date at time of exam.    PHYSICAL EXAMINATION:  Patient has no new complaints.  GENERAL: The patient is a well-developed, well-nourished, in no apparent distress.     Vital Signs Last 24 Hrs  T(C): 36.4 (14 Mar 2018 07:35), Max: 36.9 (13 Mar 2018 15:20)  T(F): 97.5 (14 Mar 2018 07:35), Max: 98.5 (13 Mar 2018 15:20)  HR: 71 (14 Mar 2018 07:35) (60 - 71)  BP: 102/71 (14 Mar 2018 07:35) (102/71 - 126/73)  BP(mean): --  RR: 16 (14 Mar 2018 07:35) (16 - 18)  SpO2: 94% (14 Mar 2018 07:35) (93% - 98%)   (if applicable)    Chest Tube (if applicable)    HEENT: Head is normocephalic and atraumatic.     NECK: Supple, no palpable adenopathy.    LUNGS: Clear to auscultation, no wheezing, rales, or rhonchi.    HEART: Regular rate and rhythm without murmur.    ABDOMEN: Soft, nontender, and nondistended.      EXTREMITIES: Without any cyanosis, clubbing, rash, lesions or edema.    NEUROLOGIC: Awake, alert.    SKIN: Warm, dry, good turgor.    LABS:                        13.2   5.3   )-----------( 205      ( 13 Mar 2018 12:41 )             39.7     03-14    140  |  108  |  13  ----------------------------<  93  4.3   |  27  |  0.72    Ca    8.7      14 Mar 2018 08:45      PT/INR - ( 14 Mar 2018 08:45 )   PT: 11.6 sec;   INR: 1.06 ratio         PTT - ( 14 Mar 2018 08:45 )  PTT:39.2 sec                    MICROBIOLOGY: (if applicable)    RADIOLOGY & ADDITIONAL STUDIES:  EKG:   CXR:  ECHO:    IMPRESSION: 67y Male PAST MEDICAL & SURGICAL HISTORY:  DVT (deep venous thrombosis)  No significant past surgical history         Impression; 68 Y/O Male was sent from PMD for evaluation of increased size of R leg DVT, while on xarelto. Patient also found to have "mild PE" at [facility].     Suggestion:   - Patient clinically improved, RLE edema improving   - lovenox to coumadin   - GI prophylaxis.    - OOB as tolerated

## 2018-03-14 NOTE — PROGRESS NOTE ADULT - PROBLEM SELECTOR PLAN 1
Patient failed Xarelto  Coumadin 5 mg po qhs and full dose Lovenox until INR theraupetic  Unremarkable CT Abd/pelvis  Patient will need to continue follow up with outpatient Heme/Oncologist specialist and Primary Care for constant follow up of INR levels.  Heme/Onc Dr Sheppard Patient failed Xarelto  Coumadin 5 mg po qhs and full dose Lovenox until INR theraupetic  Unremarkable CT Abd/pelvis  Patient will need to continue follow up with outpatient Heme/Oncologist specialist and Primary Care for constant follow up of INR levels.  INR TODAY: 1.06  Heme/Onc Dr Sheppard

## 2018-03-14 NOTE — PROGRESS NOTE ADULT - SUBJECTIVE AND OBJECTIVE BOX
==================PGY 1 Note===================   Discussed with supervising resident and primary attending    ================CHIEF COMPLAINT===============  Patient is a 67y old  Male who presents with a chief complaint of right lower leg pain (11 Mar 2018 04:42)        =========INTERVAL HPI/OVERNIGHT EVENTS=========  Offers no new complaints; current symptoms resolving      ============CURRENT MEDICATIONS===============    MEDICATIONS  (STANDING):  enoxaparin Injectable 80 milliGRAM(s) SubCutaneous every 12 hours  pantoprazole    Tablet 40 milliGRAM(s) Oral before breakfast  sodium chloride 0.9%. 1000 milliLiter(s) (100 mL/Hr) IV Continuous <Continuous>    MEDICATIONS  (PRN):  acetaminophen   Tablet. 650 milliGRAM(s) Oral every 6 hours PRN Mild Pain (1 - 3)  morphine  - Injectable 2 milliGRAM(s) IV Push every 6 hours PRN Moderate Pain (4 - 6)        ============REVIEW OF SYSTEMS==================    CONSTITUTIONAL: No fever  EYES: no acute visual disturbances  NECK: No pain or stiffness  RESPIRATORY: No cough; No shortness of breath  CARDIOVASCULAR: No chest pain, no palpitations  GASTROINTESTINAL: No pain. No nausea or vomiting; No diarrhea   NEUROLOGICAL: No headache or numbness, no tremors  MUSCULOSKELETAL: No joint pain, no muscle pain  GENITOURINARY: no dysuria, no frequency, no hesitancy  PSYCHIATRY: no depression , no anxiety  ALL OTHER  ROS negative      ================VITALS SIGNS=====================  Vital Signs Last 24 Hrs  T(C): 36.4 (14 Mar 2018 07:35), Max: 37.1 (13 Mar 2018 11:28)  T(F): 97.5 (14 Mar 2018 07:35), Max: 98.8 (13 Mar 2018 11:28)  HR: 71 (14 Mar 2018 07:35) (60 - 73)  BP: 102/71 (14 Mar 2018 07:35) (102/71 - 126/73)  BP(mean): --  RR: 16 (14 Mar 2018 07:35) (16 - 18)  SpO2: 94% (14 Mar 2018 07:35) (93% - 98%)    ===============PHYSICAL EXAM====================    GENERAL: NAD  HEENT: Normocephalic;  conjunctivae and sclerae clear; moist mucous membranes;   NECK : supple  CHEST/LUNG: Clear to auscultation bilaterally with good air entry   HEART: S1 S2  regular; no murmurs, gallops or rubs  ABDOMEN: Soft, Nontender, Nondistended; Bowel sounds present  EXTREMITIES: no cyanosis; no edema; no calf tenderness  SKIN: warm and dry; no rash  NERVOUS SYSTEM:  Awake and alert; Oriented  to place, person and time ; no new deficits    ==============LABORATORIES======================  LABS:                        13.2   5.3   )-----------( 205      ( 13 Mar 2018 12:41 )             39.7           PT/INR - ( 13 Mar 2018 12:41 )   PT: 11.2 sec;   INR: 1.03 ratio         PTT - ( 13 Mar 2018 12:41 )  PTT:36.9 sec    CAPILLARY BLOOD GLUCOSE      POCT Blood Glucose.: 95 mg/dL (14 Mar 2018 07:44)      =============INPUTS/OUPUTS=====================    03-13-18 @ 07:01  -  03-14-18 @ 07:00  --------------------------------------------------------  IN: 293 mL / OUT: 0 mL / NET: 293 mL          RADIOLOGY & ADDITIONAL TESTS:    Imaging Personally Reviewed:  YES    Consultant(s) Notes Reviewed:   YES    Care Discussed with Consultants : YES    Plan of care was discussed with patient  and /or primary care giver; all questions and concerns were addressed and care was aligned with patient's wishes. Time was allowed for questions that were answered to the best of my abilities

## 2018-03-14 NOTE — PROGRESS NOTE ADULT - ASSESSMENT
imp/rec    DVT now on Lovenox to coumadin    Macrocytosis w minimal anemia. Main ddx is early B12 defic and macrocytosis of ETOH. B12 was in 200's and MMA is pending. If MMA is high, will do IF AB.    Tachy/danuta. Seen by EP-no intervention needed.

## 2018-03-14 NOTE — PROGRESS NOTE ADULT - SUBJECTIVE AND OBJECTIVE BOX
Patient is a 67y old  Male who presents with a chief complaint of right lower leg pain (11 Mar 2018 04:42)       Subjective: No new c/o    MEDICATIONS  (STANDING):  enoxaparin Injectable 80 milliGRAM(s) SubCutaneous every 12 hours  pantoprazole    Tablet 40 milliGRAM(s) Oral before breakfast  sodium chloride 0.9%. 1000 milliLiter(s) (100 mL/Hr) IV Continuous <Continuous>  warfarin 5 milliGRAM(s) Oral once    MEDICATIONS  (PRN):  acetaminophen   Tablet. 650 milliGRAM(s) Oral every 6 hours PRN Mild Pain (1 - 3)  morphine  - Injectable 2 milliGRAM(s) IV Push every 6 hours PRN Moderate Pain (4 - 6)      Allergies    No Known Allergies    Intolerances        Vital Signs Last 24 Hrs  T(C): 36.4 (14 Mar 2018 07:35), Max: 36.7 (13 Mar 2018 23:13)  T(F): 97.5 (14 Mar 2018 07:35), Max: 98.1 (13 Mar 2018 23:13)  HR: 71 (14 Mar 2018 07:35) (60 - 71)  BP: 102/71 (14 Mar 2018 07:35) (102/71 - 126/73)  BP(mean): --  RR: 16 (14 Mar 2018 07:35) (16 - 18)  SpO2: 94% (14 Mar 2018 07:35) (94% - 98%)    PHYSICAL EXAM  General: adult in NAD  HEENT: clear oropharynx, anicteric sclera, pink conjunctiva  Neck: supple  CV: normal S1/S2 with no murmur rubs or gallops  Lungs: positive air movement b/l ant lungs,clear to auscultation, no wheezes, no rales  Abdomen: soft non-tender non-distended, no hepatosplenomegaly  Ext: no clubbing cyanosis or edema ( edema is less than)  Skin: no rashes and no petechiae  Neuro: alert and oriented X 4, no focal deficits  LABS:                          13.2   5.3   )-----------( 205      ( 13 Mar 2018 12:41 )             39.7         Mean Cell Volume : 102.0 fl  Mean Cell Hemoglobin : 33.8 pg  Mean Cell Hemoglobin Concentration : 33.2 gm/dL  Auto Neutrophil # : 3.2 K/uL  Auto Lymphocyte # : 1.4 K/uL  Auto Monocyte # : 0.4 K/uL  Auto Eosinophil # : 0.1 K/uL  Auto Basophil # : 0.1 K/uL  Auto Neutrophil % : 61.2 %  Auto Lymphocyte % : 27.0 %  Auto Monocyte % : 8.5 %  Auto Eosinophil % : 2.1 %  Auto Basophil % : 1.2 %    Serial CBC's  03-13 @ 12:41  Hct-39.7 / Hgb-13.2 / Plat-205 / RBC-3.89 / WBC-5.3          Serial CBC's  03-10 @ 16:59  Hct-42.2 / Hgb-13.4 / Plat-248 / RBC-4.16 / WBC-7.8            03-14    140  |  108  |  13  ----------------------------<  93  4.3   |  27  |  0.72    Ca    8.7      14 Mar 2018 08:45        PT/INR - ( 14 Mar 2018 08:45 )   PT: 11.6 sec;   INR: 1.06 ratio         PTT - ( 14 Mar 2018 08:45 )  PTT:39.2 sec    Vitamin B12, Serum: 267 pg/mL (03-11 @ 21:39)  Folate, Serum: 5.9 ng/mL (03-11 @ 21:39)                          RADIOLOGY & ADDITIONAL STUDIES:

## 2018-03-14 NOTE — PROGRESS NOTE ADULT - SUBJECTIVE AND OBJECTIVE BOX
Patient seen and examined.     MEDICATIONS  (STANDING):  enoxaparin Injectable 80 milliGRAM(s) SubCutaneous every 12 hours  pantoprazole    Tablet 40 milliGRAM(s) Oral before breakfast  sodium chloride 0.9%. 1000 milliLiter(s) (100 mL/Hr) IV Continuous <Continuous>  warfarin 5 milliGRAM(s) Oral once      MEDICATIONS  (PRN):  acetaminophen   Tablet. 650 milliGRAM(s) Oral every 6 hours PRN Mild Pain (1 - 3)  morphine  - Injectable 2 milliGRAM(s) IV Push every 6 hours PRN Moderate Pain (4 - 6)   Medications up to date at time of exam.      PHYSICAL EXAMINATION:  Patient has no new complaints.  GENERAL: The patient is a well-developed, well-nourished, in no apparent distress.     Vital Signs Last 24 Hrs  T(C): 37.2 (14 Mar 2018 15:58), Max: 37.2 (14 Mar 2018 15:58)  T(F): 98.9 (14 Mar 2018 15:58), Max: 98.9 (14 Mar 2018 15:58)  HR: 88 (14 Mar 2018 15:58) (60 - 88)  BP: 113/80 (14 Mar 2018 15:58) (102/71 - 126/73)  BP(mean): --  RR: 18 (14 Mar 2018 15:58) (16 - 18)  SpO2: 97% (14 Mar 2018 15:58) (94% - 98%)   (if applicable)      HEENT: Head is normocephalic and atraumatic. Extraocular muscles are intact. Mucous membranes are moist.     NECK: Supple, no palpable adenopathy.    LUNGS: Clear to auscultation, no wheezing, rales, or rhonchi.    HEART: Regular rate and rhythm without murmur.    ABDOMEN: Soft, nontender, and nondistended.  No hepatosplenomegaly is noted.    EXTREMITIES: Without any cyanosis, clubbing, rash, lesions or edema.    NEUROLOGIC: Awake, alert, oriented.     SKIN: Warm, dry, good turgor.      LABS:                        13.2   5.3   )-----------( 205      ( 13 Mar 2018 12:41 )             39.7     03-14    140  |  108  |  13  ----------------------------<  93  4.3   |  27  |  0.72    Ca    8.7      14 Mar 2018 08:45      PT/INR - ( 14 Mar 2018 08:45 )   PT: 11.6 sec;   INR: 1.06 ratio         PTT - ( 14 Mar 2018 08:45 )  PTT:39.2 sec                    MICROBIOLOGY: (if applicable)    RADIOLOGY & ADDITIONAL STUDIES:  EKG:   CXR:  ECHO:    IMPRESSION: 67y Male PAST MEDICAL & SURGICAL HISTORY:  DVT (deep venous thrombosis)  No significant past surgical history     Patient sent from PMD for evaluation of increased size of R leg DVT, while on xarelto.     RECOMMENDATIONS:    2D echo shows normal LV function. Patient noted with episodes of ST and SB, he denies having any palpitations or dizziness.  As per EP, episodes of tachycardia and bradycardia were sinus, no furthur EP work up.  BP appears well controlled.  Tele d/c'd  DVT and GI prophylaxis.

## 2018-03-15 LAB
APTT BLD: 36.6 SEC — SIGNIFICANT CHANGE UP (ref 27.5–37.4)
INR BLD: 1.06 RATIO — SIGNIFICANT CHANGE UP (ref 0.88–1.16)
PROTHROM AB SERPL-ACNC: 11.6 SEC — SIGNIFICANT CHANGE UP (ref 9.8–12.7)

## 2018-03-15 RX ORDER — WARFARIN SODIUM 2.5 MG/1
7.5 TABLET ORAL ONCE
Qty: 0 | Refills: 0 | Status: COMPLETED | OUTPATIENT
Start: 2018-03-15 | End: 2018-03-15

## 2018-03-15 RX ADMIN — PANTOPRAZOLE SODIUM 40 MILLIGRAM(S): 20 TABLET, DELAYED RELEASE ORAL at 06:10

## 2018-03-15 RX ADMIN — WARFARIN SODIUM 7.5 MILLIGRAM(S): 2.5 TABLET ORAL at 22:31

## 2018-03-15 RX ADMIN — ENOXAPARIN SODIUM 80 MILLIGRAM(S): 100 INJECTION SUBCUTANEOUS at 17:18

## 2018-03-15 RX ADMIN — ENOXAPARIN SODIUM 80 MILLIGRAM(S): 100 INJECTION SUBCUTANEOUS at 06:10

## 2018-03-15 NOTE — PROGRESS NOTE ADULT - SUBJECTIVE AND OBJECTIVE BOX
Time of Visit:  Patient seen and examined.     MEDICATIONS  (STANDING):  enoxaparin Injectable 80 milliGRAM(s) SubCutaneous every 12 hours  pantoprazole    Tablet 40 milliGRAM(s) Oral before breakfast  sodium chloride 0.9%. 1000 milliLiter(s) (100 mL/Hr) IV Continuous <Continuous>  warfarin 7.5 milliGRAM(s) Oral once      MEDICATIONS  (PRN):  acetaminophen   Tablet. 650 milliGRAM(s) Oral every 6 hours PRN Mild Pain (1 - 3)  morphine  - Injectable 2 milliGRAM(s) IV Push every 6 hours PRN Moderate Pain (4 - 6)       Medications up to date at time of exam.    ROS: No fever, chills, cough, congestion, SOB.  PHYSICAL EXAMINATION:  Vital Signs Last 24 Hrs  T(C): 36.9 (15 Mar 2018 11:35), Max: 37.2 (14 Mar 2018 15:58)  T(F): 98.5 (15 Mar 2018 11:35), Max: 98.9 (14 Mar 2018 15:58)  HR: 65 (15 Mar 2018 11:35) (62 - 88)  BP: 108/71 (15 Mar 2018 11:35) (104/63 - 117/75)  BP(mean): --  RR: 18 (15 Mar 2018 11:35) (16 - 18)  SpO2: 95% (15 Mar 2018 11:35) (95% - 99%)   (if applicable)    General; Alert and oriented. No acute distress. Able to answer questions appropriately with no SOB.    HEENT: Normocephalic and atraumatic. Extraocular muscles are intact. Moist mucosa.     NECK: Supple, no palpable adenopathy.    LUNGS: Clear to auscultation, no wheezing, rales, or rhonchi. No use of accessory muscle.    HEART: S1 S2 Regular rate and no click/ rub.    ABDOMEN: Soft, nontender, and nondistended.  No hepatosplenomegaly is noted. Active bowel sounds.    NEUROLOGIC: Awake, alert, oriented.     SKIN: Warm and moist. Non diaphoretic.      LABS:    03-14    140  |  108  |  13  ----------------------------<  93  4.3   |  27  |  0.72    Ca    8.7      14 Mar 2018 08:45      PT/INR - ( 15 Mar 2018 07:21 )   PT: 11.6 sec;   INR: 1.06 ratio         PTT - ( 15 Mar 2018 07:21 )  PTT:36.6 sec  RADIOLOGY & ADDITIONAL STUDIES:  EKG:   CXR: < from: Xray Chest 1 View- PORTABLE-Urgent (03.11.18 @ 09:32) >  INTERPRETATION:  CLINICAL INFORMATION: Abnormal chest sounds.    TECHNIQUE: Frontal view of the chest   COMPARISON: None.    FINDINGS:    LUNGS/PLEURA: No focal consolidation, pleural effusion, or pneumothorax.  MEDIASTINUM: Cardiomediastinal silhouette is unremarkable.  OTHER: Old right-sided rib fractures.    IMPRESSION:     No focal consolidation or pleural effusion.     PAST MEDICAL & SURGICAL HISTORY:  DVT (deep venous thrombosis)  No significant past surgical history    Impression; 66 Y/O Male was sent from PMD for evaluation of increased size of R leg DVT, while on xarelto. Patient also found to have "mild PE" at [facility]. O2 saturation 96% room air on exam.    Suggestion:  Oxygen supplementation if needed to keep O2 saturation >90%, O 2saturation 96% room air.   Continue Lovenox 80 mg SQ and INR 1.06 Coumadin 7.5 mg. If INR >2.0 can totally switch to Coumadin.   GI prophylaxis.   No need for respiratory Tx at this time. Time of Visit:  Patient seen and examined.     MEDICATIONS  (STANDING):  enoxaparin Injectable 80 milliGRAM(s) SubCutaneous every 12 hours  pantoprazole    Tablet 40 milliGRAM(s) Oral before breakfast  sodium chloride 0.9%. 1000 milliLiter(s) (100 mL/Hr) IV Continuous <Continuous>  warfarin 7.5 milliGRAM(s) Oral once      MEDICATIONS  (PRN):  acetaminophen   Tablet. 650 milliGRAM(s) Oral every 6 hours PRN Mild Pain (1 - 3)  morphine  - Injectable 2 milliGRAM(s) IV Push every 6 hours PRN Moderate Pain (4 - 6)       Medications up to date at time of exam.    ROS: No fever, chills, cough, congestion, SOB.  PHYSICAL EXAMINATION:  Vital Signs Last 24 Hrs  T(C): 36.9 (15 Mar 2018 11:35), Max: 37.2 (14 Mar 2018 15:58)  T(F): 98.5 (15 Mar 2018 11:35), Max: 98.9 (14 Mar 2018 15:58)  HR: 65 (15 Mar 2018 11:35) (62 - 88)  BP: 108/71 (15 Mar 2018 11:35) (104/63 - 117/75)  BP(mean): --  RR: 18 (15 Mar 2018 11:35) (16 - 18)  SpO2: 95% (15 Mar 2018 11:35) (95% - 99%)   (if applicable)    General; Alert and oriented. No acute distress. Able to answer questions appropriately with no SOB.    HEENT: Normocephalic and atraumatic. Extraocular muscles are intact. Moist mucosa.     NECK: Supple, no palpable adenopathy.    LUNGS: Clear to auscultation, no wheezing, rales, or rhonchi. No use of accessory muscle.    HEART: S1 S2 Regular rate and no click/ rub.    ABDOMEN: Soft, nontender, and nondistended.  No hepatosplenomegaly is noted. Active bowel sounds.    NEUROLOGIC: Awake, alert, oriented.     SKIN: Warm and moist. Non diaphoretic.      LABS:    03-14    140  |  108  |  13  ----------------------------<  93  4.3   |  27  |  0.72    Ca    8.7      14 Mar 2018 08:45      PT/INR - ( 15 Mar 2018 07:21 )   PT: 11.6 sec;   INR: 1.06 ratio         PTT - ( 15 Mar 2018 07:21 )  PTT:36.6 sec  RADIOLOGY & ADDITIONAL STUDIES:  EKG:   CXR: < from: Xray Chest 1 View- PORTABLE-Urgent (03.11.18 @ 09:32) >  INTERPRETATION:  CLINICAL INFORMATION: Abnormal chest sounds.    TECHNIQUE: Frontal view of the chest   COMPARISON: None.    FINDINGS:    LUNGS/PLEURA: No focal consolidation, pleural effusion, or pneumothorax.  MEDIASTINUM: Cardiomediastinal silhouette is unremarkable.  OTHER: Old right-sided rib fractures.    IMPRESSION:     No focal consolidation or pleural effusion.     PAST MEDICAL & SURGICAL HISTORY:  DVT (deep venous thrombosis)  No significant past surgical history    Impression; 66 Y/O Male was sent from PMD for evaluation of increased size of R leg DVT, while on xarelto. Patient also found to have "mild PE" at [facility]. O2 saturation 96% room air on exam.    Suggestion:  Oxygen supplementation if needed to keep O2 saturation >90%, O 2saturation 96% room air.   Continue Lovenox 80 mg SQ and INR 1.06 Coumadin 7.5 mg. If INR >2.0 can totally switch to Coumadin.   GI prophylaxis.   No need for respiratory Tx at this time.   out pat pulmo f/u

## 2018-03-15 NOTE — PROGRESS NOTE ADULT - SUBJECTIVE AND OBJECTIVE BOX
==================PGY 1 Note===================   Discussed with supervising resident and primary attending    ================CHIEF COMPLAINT===============  Patient is a 67y old  Male who presents with a chief complaint of right lower leg pain (11 Mar 2018 04:42)        =========INTERVAL HPI/OVERNIGHT EVENTS=========  Offers no new complaints      ============CURRENT MEDICATIONS===============    MEDICATIONS  (STANDING):  enoxaparin Injectable 80 milliGRAM(s) SubCutaneous every 12 hours  pantoprazole    Tablet 40 milliGRAM(s) Oral before breakfast  sodium chloride 0.9%. 1000 milliLiter(s) (100 mL/Hr) IV Continuous <Continuous>    MEDICATIONS  (PRN):  acetaminophen   Tablet. 650 milliGRAM(s) Oral every 6 hours PRN Mild Pain (1 - 3)  morphine  - Injectable 2 milliGRAM(s) IV Push every 6 hours PRN Moderate Pain (4 - 6)        ============REVIEW OF SYSTEMS==================    CONSTITUTIONAL: No fever  EYES: no acute visual disturbances  NECK: No pain or stiffness  RESPIRATORY: No cough; No shortness of breath  CARDIOVASCULAR: No chest pain, no palpitations  GASTROINTESTINAL: No pain. No nausea or vomiting; No diarrhea   NEUROLOGICAL: No headache or numbness, no tremors  MUSCULOSKELETAL: No joint pain, no muscle pain  GENITOURINARY: no dysuria, no frequency, no hesitancy  PSYCHIATRY: no depression , no anxiety  ALL OTHER  ROS negative      ================VITALS SIGNS=====================  Vital Signs Last 24 Hrs  T(C): 36.9 (15 Mar 2018 11:35), Max: 37.2 (14 Mar 2018 15:58)  T(F): 98.5 (15 Mar 2018 11:35), Max: 98.9 (14 Mar 2018 15:58)  HR: 65 (15 Mar 2018 11:35) (62 - 88)  BP: 108/71 (15 Mar 2018 11:35) (104/63 - 117/75)  BP(mean): --  RR: 18 (15 Mar 2018 11:35) (16 - 18)  SpO2: 95% (15 Mar 2018 11:35) (95% - 99%)    ===============PHYSICAL EXAM====================    GENERAL: NAD  HEENT: Normocephalic;  conjunctivae and sclerae clear; moist mucous membranes;   NECK : supple  CHEST/LUNG: Clear to auscultation bilaterally with good air entry   HEART: S1 S2  regular; no murmurs, gallops or rubs  ABDOMEN: Soft, Nontender, Nondistended; Bowel sounds present  EXTREMITIES: no cyanosis; no edema; no calf tenderness  SKIN: warm and dry; no rash  NERVOUS SYSTEM:  Awake and alert; Oriented  to place, person and time    ==============LABORATORIES======================  LABS:                        13.2   5.3   )-----------( 205      ( 13 Mar 2018 12:41 )             39.7     03-14    140  |  108  |  13  ----------------------------<  93  4.3   |  27  |  0.72    Ca    8.7      14 Mar 2018 08:45      PT/INR - ( 15 Mar 2018 07:21 )   PT: 11.6 sec;   INR: 1.06 ratio         PTT - ( 15 Mar 2018 07:21 )  PTT:36.6 sec    CAPILLARY BLOOD GLUCOSE          =============INPUTS/OUPUTS=====================    03-14-18 @ 07:01  -  03-15-18 @ 07:00  --------------------------------------------------------  IN: 200 mL / OUT: 0 mL / NET: 200 mL          RADIOLOGY & ADDITIONAL TESTS:    Imaging Personally Reviewed:  YES    Consultant(s) Notes Reviewed:   YES    Care Discussed with Consultants : YES    Plan of care was discussed with patient  and /or primary care giver; all questions and concerns were addressed and care was aligned with patient's wishes. Time was allowed for questions that were answered to the best of my abilities

## 2018-03-15 NOTE — PROGRESS NOTE ADULT - SUBJECTIVE AND OBJECTIVE BOX
Patient is a 67y old  Male with right lower ext dvt    Now on lovenox and coumadin  had 5 mg and now being treated with 7.5 today    MEDICATIONS  (STANDING):  enoxaparin Injectable 80 milliGRAM(s) SubCutaneous every 12 hours  pantoprazole    Tablet 40 milliGRAM(s) Oral before breakfast  sodium chloride 0.9%. 1000 milliLiter(s) (100 mL/Hr) IV Continuous <Continuous>  warfarin 7.5 milliGRAM(s) Oral once    MEDICATIONS  (PRN):  acetaminophen   Tablet. 650 milliGRAM(s) Oral every 6 hours PRN Mild Pain (1 - 3)  morphine  - Injectable 2 milliGRAM(s) IV Push every 6 hours PRN Moderate Pain (4 - 6)      Allergies    No Known Allergies    Intolerances        Vital Signs Last 24 Hrs  T(C): 36.9 (15 Mar 2018 11:35), Max: 37.2 (14 Mar 2018 15:58)  T(F): 98.5 (15 Mar 2018 11:35), Max: 98.9 (14 Mar 2018 15:58)  HR: 65 (15 Mar 2018 11:35) (62 - 88)  BP: 108/71 (15 Mar 2018 11:35) (104/63 - 117/75)  BP(mean): --  RR: 18 (15 Mar 2018 11:35) (16 - 18)  SpO2: 95% (15 Mar 2018 11:35) (95% - 99%)    PHYSICAL EXAM  General: adult in NAD  HEENT: clear oropharynx, anicteric sclera, pink conjunctiva  Neck: supple  CV: normal S1/S2 with no murmur rubs or gallops  Lungs: positive air movement b/l ant lungs,clear to auscultation, no wheezes, no rales  Abdomen: soft non-tender non-distended, no hepatosplenomegaly  Ext: no clubbing cyanosis or edema  improved swelling in the right lower leg  Skin: no rashes and no petechiae  Neuro: alert and oriented X 4, no focal deficits      LABS:            Serial CBC's  03-13 @ 12:41  Hct-39.7 / Hgb-13.2 / Plat-205 / RBC-3.89 / WBC-5.3      03-14    140  |  108  |  13  ----------------------------<  93  4.3   |  27  |  0.72    Ca    8.7      14 Mar 2018 08:45        PT/INR - ( 15 Mar 2018 07:21 )   PT: 11.6 sec;   INR: 1.06 ratio         PTT - ( 15 Mar 2018 07:21 )  PTT:36.6 sec    Vitamin B12, Serum: 267 pg/mL (03-11 @ 21:39)  Folate, Serum: 5.9 ng/mL (03-11 @ 21:39)

## 2018-03-15 NOTE — PROGRESS NOTE ADULT - SUBJECTIVE AND OBJECTIVE BOX
Patient seen and examined.       MEDICATIONS  (STANDING):  enoxaparin Injectable 80 milliGRAM(s) SubCutaneous every 12 hours  pantoprazole    Tablet 40 milliGRAM(s) Oral before breakfast  sodium chloride 0.9%. 1000 milliLiter(s) (100 mL/Hr) IV Continuous <Continuous>  warfarin 7.5 milliGRAM(s) Oral once      MEDICATIONS  (PRN):  acetaminophen   Tablet. 650 milliGRAM(s) Oral every 6 hours PRN Mild Pain (1 - 3)  morphine  - Injectable 2 milliGRAM(s) IV Push every 6 hours PRN Moderate Pain (4 - 6)   Medications up to date at time of exam.      PHYSICAL EXAMINATION:  Patient has no new complaints.  GENERAL: The patient is a well-developed, well-nourished, in no apparent distress.     Vital Signs Last 24 Hrs  T(C): 36.9 (15 Mar 2018 15:45), Max: 37.1 (14 Mar 2018 19:42)  T(F): 98.4 (15 Mar 2018 15:45), Max: 98.8 (14 Mar 2018 19:42)  HR: 69 (15 Mar 2018 15:45) (62 - 71)  BP: 108/67 (15 Mar 2018 15:45) (104/63 - 117/75)  BP(mean): --  RR: 18 (15 Mar 2018 15:45) (16 - 18)  SpO2: 98% (15 Mar 2018 15:45) (95% - 99%)   (if applicable)      HEENT: Head is normocephalic and atraumatic.     NECK: Supple, no palpable adenopathy.    LUNGS: Clear to auscultation, no wheezing, rales, or rhonchi.    HEART: Regular rate and rhythm without murmur.    ABDOMEN: Soft, nontender, and nondistended.  No hepatosplenomegaly is noted.    EXTREMITIES: Without any cyanosis, clubbing, rash, lesions or edema.    NEUROLOGIC: Awake, alert.    SKIN: Warm, dry, good turgor.      LABS:    03-14    140  |  108  |  13  ----------------------------<  93  4.3   |  27  |  0.72    Ca    8.7      14 Mar 2018 08:45      PT/INR - ( 15 Mar 2018 07:21 )   PT: 11.6 sec;   INR: 1.06 ratio         PTT - ( 15 Mar 2018 07:21 )  PTT:36.6 sec                    MICROBIOLOGY: (if applicable)    RADIOLOGY & ADDITIONAL STUDIES:  EKG:   CXR:  ECHO:    IMPRESSION: 67y Male PAST MEDICAL & SURGICAL HISTORY:  DVT (deep venous thrombosis)  No significant past surgical history       Patient sent from PMD for evaluation of increased size of R leg DVT, while on xarelto.     RECOMMENDATIONS:    2D echo shows normal LV function. Patient noted with episodes of ST and SB, he denies having any palpitations or dizziness.  BP appears well controlled.  Tele d/c'd  DVT and GI prophylaxis.

## 2018-03-15 NOTE — PROGRESS NOTE ADULT - ASSESSMENT
Unprovoked DVT LLE  Being Bridged to coumadin.  May take longer    Recommend patient education on vitamin k containing foods  Recommend Lovenox injection teaching and then he can be discharged on Lovenox 120 mg sq once daily ( 1.5mg/kg daily) along with Coumadin 7.5 mg daily.  Then he can be followed in our office on Monday to adjust the dose of coumadin and/or discontinue Lovenox at that time.    He wants to go home early    Nasir Gondal  [phone]

## 2018-03-15 NOTE — PROGRESS NOTE ADULT - PROBLEM SELECTOR PLAN 1
Patient failed Xarelto  Coumadin 7.5 mg po qhs and full dose Lovenox until INR theraupetic  Unremarkable CT Abd/pelvis  Patient will need to continue follow up with outpatient Heme/Oncologist specialist and Primary Care for constant follow up of INR levels.  INR TODAY: 1.07  Heme/Onc Dr Sheppard

## 2018-03-16 LAB
APTT BLD: 32.1 SEC — SIGNIFICANT CHANGE UP (ref 27.5–37.4)
INR BLD: 1.09 RATIO — SIGNIFICANT CHANGE UP (ref 0.88–1.16)
METHYLMALONATE SERPL-SCNC: 282 NMOL/L — SIGNIFICANT CHANGE UP (ref 0–378)
PHOSPHOLIPASE A2 RECEPTOR ELISA: <2 RU/ML — SIGNIFICANT CHANGE UP
PHOSPHOLIPASE A2 RECEPTOR IFA: NEGATIVE — SIGNIFICANT CHANGE UP
PROTHROM AB SERPL-ACNC: 11.9 SEC — SIGNIFICANT CHANGE UP (ref 9.8–12.7)

## 2018-03-16 RX ORDER — WARFARIN SODIUM 2.5 MG/1
7.5 TABLET ORAL ONCE
Qty: 0 | Refills: 0 | Status: DISCONTINUED | OUTPATIENT
Start: 2018-03-16 | End: 2018-03-16

## 2018-03-16 RX ORDER — RIVAROXABAN 15 MG-20MG
1 KIT ORAL
Qty: 0 | Refills: 0 | COMMUNITY

## 2018-03-16 RX ORDER — WARFARIN SODIUM 2.5 MG/1
7.5 TABLET ORAL ONCE
Qty: 0 | Refills: 0 | Status: COMPLETED | OUTPATIENT
Start: 2018-03-16 | End: 2018-03-16

## 2018-03-16 RX ORDER — WARFARIN SODIUM 2.5 MG/1
5 TABLET ORAL ONCE
Qty: 0 | Refills: 0 | Status: DISCONTINUED | OUTPATIENT
Start: 2018-03-16 | End: 2018-03-16

## 2018-03-16 RX ADMIN — WARFARIN SODIUM 7.5 MILLIGRAM(S): 2.5 TABLET ORAL at 21:14

## 2018-03-16 RX ADMIN — ENOXAPARIN SODIUM 80 MILLIGRAM(S): 100 INJECTION SUBCUTANEOUS at 17:15

## 2018-03-16 RX ADMIN — PANTOPRAZOLE SODIUM 40 MILLIGRAM(S): 20 TABLET, DELAYED RELEASE ORAL at 06:17

## 2018-03-16 RX ADMIN — ENOXAPARIN SODIUM 80 MILLIGRAM(S): 100 INJECTION SUBCUTANEOUS at 06:17

## 2018-03-16 NOTE — PROGRESS NOTE ADULT - SUBJECTIVE AND OBJECTIVE BOX
Time of Visit:  Patient seen and examined.     MEDICATIONS  (STANDING):  enoxaparin Injectable 80 milliGRAM(s) SubCutaneous every 12 hours  pantoprazole    Tablet 40 milliGRAM(s) Oral before breakfast  sodium chloride 0.9%. 1000 milliLiter(s) (100 mL/Hr) IV Continuous <Continuous>  warfarin 7.5 milliGRAM(s) Oral once      MEDICATIONS  (PRN):  acetaminophen   Tablet. 650 milliGRAM(s) Oral every 6 hours PRN Mild Pain (1 - 3)  morphine  - Injectable 2 milliGRAM(s) IV Push every 6 hours PRN Moderate Pain (4 - 6)     Medications up to date at time of exam.    PHYSICAL EXAMINATION:  Patient has no new complaints.  GENERAL: The patient is a well-developed, well-nourished, in no apparent distress.     Vital Signs Last 24 Hrs  T(C): 36.9 (16 Mar 2018 11:15), Max: 37.8 (15 Mar 2018 23:25)  T(F): 98.4 (16 Mar 2018 11:15), Max: 100 (15 Mar 2018 23:25)  HR: 96 (16 Mar 2018 11:15) (63 - 96)  BP: 116/85 (16 Mar 2018 11:15) (100/68 - 117/70)  BP(mean): --  RR: 18 (16 Mar 2018 11:15) (17 - 18)  SpO2: 96% (16 Mar 2018 11:15) (91% - 98%)   (if applicable)    Chest Tube (if applicable)    HEENT: Head is normocephalic and atraumatic.     NECK: Supple, no palpable adenopathy.    LUNGS: Clear to auscultation, no wheezing, rales, or rhonchi.    HEART: Regular rate and rhythm without murmur.    ABDOMEN: Soft, nontender, and nondistended.      EXTREMITIES: Without any cyanosis, clubbing, rash, lesions or edema.    NEUROLOGIC: Awake, alert.    SKIN: Warm, dry, good turgor.    LABS:          PT/INR - ( 16 Mar 2018 06:15 )   PT: 11.9 sec;   INR: 1.09 ratio         PTT - ( 16 Mar 2018 06:15 )  PTT:32.1 sec                    MICROBIOLOGY: (if applicable)    RADIOLOGY & ADDITIONAL STUDIES:  EKG:   CXR:  ECHO:    IMPRESSION: 67y Male PAST MEDICAL & SURGICAL HISTORY:  DVT (deep venous thrombosis)  No significant past surgical history       Impression; 68 Y/O Male was sent from PMD for evaluation of increased size of R leg DVT, while on xarelto. Patient also found to have "mild PE" at [facility].     Patient clinically improved.     Suggestion:   - Patient clinically improved, RLE edema improving   - lovenox to coumadin   - GI prophylaxis.    - OOB as tolerated

## 2018-03-16 NOTE — DISCHARGE NOTE ADULT - NS AS DC VTE INSTRUCTION
Coumadin/Warfarin - Compliance.../Coumadin/Warfarin - Dietary Advice.../Coumadin/Warfarin - Follow-up monitoring.../Coumadin/Warfarin - Potential for adverse drug reactions and interactions Coumadin/Warfarin - Follow-up monitoring.../Coumadin/Warfarin - Compliance.../Coumadin/Warfarin - Dietary Advice.../Coumadin/Warfarin - Potential for adverse drug reactions and interactions

## 2018-03-16 NOTE — PROGRESS NOTE ADULT - SUBJECTIVE AND OBJECTIVE BOX
Patient seen and examined.   Time of visit:    MEDICATIONS  (STANDING):  enoxaparin Injectable 80 milliGRAM(s) SubCutaneous every 12 hours  pantoprazole    Tablet 40 milliGRAM(s) Oral before breakfast  sodium chloride 0.9%. 1000 milliLiter(s) (100 mL/Hr) IV Continuous <Continuous>      MEDICATIONS  (PRN):  acetaminophen   Tablet. 650 milliGRAM(s) Oral every 6 hours PRN Mild Pain (1 - 3)  morphine  - Injectable 2 milliGRAM(s) IV Push every 6 hours PRN Moderate Pain (4 - 6)   Medications up to date at time of exam.      PHYSICAL EXAMINATION:  Patient has no new complaints.  GENERAL: The patient is a well-developed, well-nourished, in no apparent distress.     Vital Signs Last 24 Hrs  T(C): 36.9 (16 Mar 2018 20:23), Max: 37.8 (15 Mar 2018 23:25)  T(F): 98.5 (16 Mar 2018 20:23), Max: 100 (15 Mar 2018 23:25)  HR: 72 (16 Mar 2018 20:23) (63 - 96)  BP: 125/85 (16 Mar 2018 20:23) (100/68 - 125/85)  BP(mean): --  RR: 16 (16 Mar 2018 20:23) (16 - 18)  SpO2: 97% (16 Mar 2018 20:23) (91% - 99%)   (if applicable)      HEENT: Head is normocephalic and atraumatic. Extraocular muscles are intact. Mucous membranes are moist.     NECK: Supple, no palpable adenopathy.    LUNGS: Clear to auscultation, no wheezing, rales, or rhonchi.    HEART: Regular rate and rhythm without murmur.    ABDOMEN: Soft, nontender, and nondistended.  No hepatosplenomegaly is noted.    EXTREMITIES: Without any cyanosis, clubbing, rash, lesions or edema.    NEUROLOGIC: Awake, alert, oriented.     SKIN: Warm, dry, good turgor.      LABS:          PT/INR - ( 16 Mar 2018 06:15 )   PT: 11.9 sec;   INR: 1.09 ratio         PTT - ( 16 Mar 2018 06:15 )  PTT:32.1 sec                    MICROBIOLOGY: (if applicable)    RADIOLOGY & ADDITIONAL STUDIES:  EKG:   CXR:  ECHO:    IMPRESSION: 67y Male PAST MEDICAL & SURGICAL HISTORY:  DVT (deep venous thrombosis)  No significant past surgical history     RECOMMENDATIONS:    2D echo shows normal LV function. Patient noted with episodes of ST and SB, he denies having any palpitations or dizziness.  BP appears well controlled.  Tele d/c'd  INR still not therapeutic despite increasing doses of coumadin, heme following.  DVT and GI prophylaxis.

## 2018-03-16 NOTE — DISCHARGE NOTE ADULT - HOSPITAL COURSE
Background  and course of admission:    67 Male with history of DVT on Xarelto p/w extensive RLE DVT failed outpatient Xarelto. Ultrasound of the Right lower extremity: The right common femoral vein and the proximal femoral vein are patent. There is partial thrombosis of the right mid and distal femoral vein, popliteal vein and posterior tibial vein. Admitted to the floor for New DVT after failing treatment. Patient failed Xarelto  Coumadin was done started with full dose Lovenox until INR therapeutic was administered.   Unremarkable CT Abd/pelvis: no clots seen. Patient will need to continue follow up with outpatient Heme/Oncologist specialist and Primary Care for constant follow up of INR levels.  Goal INR 2-3 Continue with Coumadin    Given patient's improved clinical status and current hemodynamic stability, decision was made to discharge.  Please refer to patient's complete medical chart with documents for a full hospital course, for this is only a brief summary. Background  and course of admission:    67 Male with history of DVT on Xarelto p/w extensive RLE DVT failed outpatient Xarelto. Admitted to the floor for New DVT after failing treatment. Patient failed Xarelto  Coumadin and full dose Lovenox until INR therapeutic was administered. Ultrasound of the Right leg showed: There is partial thrombosis of the right mid and distal femoral vein, popliteal vein and posterior tibial vein.  Unremarkable CT Abd/pelvis: no clots seen. Patient will need to continue follow up with outpatient Heme/Oncologist specialist and Primary Care for constant follow up of INR levels. Continue with Coumadin as instructed in medication reconciliation list. Discussed with Dr Sheppard Coumadin 10mg PO will be given. NEEDS INR LEVELS CHECK in 24 -48 hours with PCP. Discussed with patient he agrees to follow with PCP.    Given patient's improved clinical status and current hemodynamic stability, decision was made to discharge.  Please refer to patient's complete medical chart with documents for a full hospital course, for this is only a brief summary.

## 2018-03-16 NOTE — DISCHARGE NOTE ADULT - CARE PLAN
Principal Discharge DX:	DVT (deep venous thrombosis)  Goal:	INR goal 2-3. Follow up for constant INR levels  Assessment and plan of treatment:	67 Male with history of DVT on Xarelto p/w extensive RLE DVT failed outpatient Xarelto.  Admitted to the floor for New DVT after failing treatment. Patient failed Xarelto  Coumadin and full dose Lovenox until INR therapeutic was administered.   Unremarkable CT Abd/pelvis: no clots seen. Patient will need to continue follow up with outpatient Heme/Oncologist specialist and Primary Care for constant follow up of INR levels.  Continue with Coumadin as instructed in medication reconciliation list  Secondary Diagnosis:	Cholelithiases  Assessment and plan of treatment:	Follow up with outpatient Primary care physician Principal Discharge DX:	DVT (deep venous thrombosis)  Goal:	INR goal 2-3. Follow up for constant INR levels  Assessment and plan of treatment:	67 Male with history of DVT on Xarelto p/w extensive RLE DVT failed outpatient Xarelto.  Ultrasound of the Right leg showed: There is partial thrombosis of the right mid and distal femoral vein, popliteal vein and posterior tibial vein.  Admitted to the floor for New DVT after failing treatment. Patient failed Xarelto  Coumadin and full dose Lovenox until INR therapeutic was administered.   Unremarkable CT Abd/pelvis: no clots seen. Patient will need to continue follow up with outpatient Heme/Oncologist specialist and Primary Care for constant follow up of INR levels.  Continue with Coumadin as instructed in medication reconciliation list.  Discussed with Dr Sheppard Coumadin 10mg PO will be given. NEEDS INR LEVELS CHECK in 24 -48 hours with PCP. Discussed with patient he agrees to follow with PCP  Secondary Diagnosis:	Cholelithiases  Assessment and plan of treatment:	Follow up with outpatient Primary care physician

## 2018-03-16 NOTE — DISCHARGE NOTE ADULT - PLAN OF CARE
INR goal 2-3. Follow up for constant INR levels 67 Male with history of DVT on Xarelto p/w extensive RLE DVT failed outpatient Xarelto.  Admitted to the floor for New DVT after failing treatment. Patient failed Xarelto  Coumadin and full dose Lovenox until INR therapeutic was administered.   Unremarkable CT Abd/pelvis: no clots seen. Patient will need to continue follow up with outpatient Heme/Oncologist specialist and Primary Care for constant follow up of INR levels.  Continue with Coumadin as instructed in medication reconciliation list Follow up with outpatient Primary care physician 67 Male with history of DVT on Xarelto p/w extensive RLE DVT failed outpatient Xarelto.  Ultrasound of the Right leg showed: There is partial thrombosis of the right mid and distal femoral vein, popliteal vein and posterior tibial vein.  Admitted to the floor for New DVT after failing treatment. Patient failed Xarelto  Coumadin and full dose Lovenox until INR therapeutic was administered.   Unremarkable CT Abd/pelvis: no clots seen. Patient will need to continue follow up with outpatient Heme/Oncologist specialist and Primary Care for constant follow up of INR levels.  Continue with Coumadin as instructed in medication reconciliation list.  Discussed with Dr Sheppard Coumadin 10mg PO will be given. NEEDS INR LEVELS CHECK in 24 -48 hours with PCP. Discussed with patient he agrees to follow with PCP

## 2018-03-16 NOTE — DISCHARGE NOTE ADULT - CARE PROVIDER_API CALL
Randy Sheppard), Internal Medicine; Medical Oncology  [address] [facility]  3rd [place] [address]  Phone: [phone]  Fax: [phone]

## 2018-03-16 NOTE — DISCHARGE NOTE ADULT - PATIENT PORTAL LINK FT
You can access the [facility] Patient Portal, offered by [facility], by registering with the following website: [email or web address]

## 2018-03-16 NOTE — PROGRESS NOTE ADULT - SUBJECTIVE AND OBJECTIVE BOX
Patient seen and examined at bedside  No acute events noted overnight  Case discussed with medical team    HPI:  67 M with history of DVT started having right lower leg pain 2 months ago and seen at [facility] and diagnosed with DVT 3 weeks ago and started on Xarelto 15mg BID and today switched to Xarelto 20mg daily presented with right lower leg swelling getting worse. pt followed up with PCP for persistent swelling and pain and repeated doppler study outpatient which showed clot increased in size and was referred to go to ED. Patient denied fever, chest pain, palpitation, dyspnea, nausea, vomiting and any other symptoms and claimed his symptoms does not limit his ambulatory status.  In ED, doppler study with Above and below the knee thrombosis of the right lower extremity. (11 Mar 2018 04:42)      PAST MEDICAL & SURGICAL HISTORY:  DVT (deep venous thrombosis)  No significant past surgical history      No Known Allergies       MEDICATIONS  (STANDING):  enoxaparin Injectable 80 milliGRAM(s) SubCutaneous every 12 hours  pantoprazole    Tablet 40 milliGRAM(s) Oral before breakfast  sodium chloride 0.9%. 1000 milliLiter(s) (100 mL/Hr) IV Continuous <Continuous>  warfarin 7.5 milliGRAM(s) Oral once    MEDICATIONS  (PRN):  acetaminophen   Tablet. 650 milliGRAM(s) Oral every 6 hours PRN Mild Pain (1 - 3)  morphine  - Injectable 2 milliGRAM(s) IV Push every 6 hours PRN Moderate Pain (4 - 6)      REVIEW OF SYSTEMS:  CONSTITUTIONAL: (+) improving malaise.   EYES: No acute change in vision   ENT:  No tinnitus  NECK: No stiffness  RESPIRATORY: No hemoptysis  CARDIOVASCULAR: No chest pain, palpitations, syncope  GASTROINTESTINAL: No hematemesis, diarrhea, melena, or hematochezia.  GENITOURINARY: No hematuria  NEUROLOGICAL: No headaches  LYMPH Nodes: No enlarged glands  ENDOCRINE: No heat or cold intolerance	    T(C): 36.9 (03-16-18 @ 11:15), Max: 37.8 (03-15-18 @ 23:25)  HR: 96 (03-16-18 @ 11:15) (63 - 96)  BP: 116/85 (03-16-18 @ 11:15) (100/68 - 117/70)  RR: 18 (03-16-18 @ 11:15) (17 - 18)  SpO2: 96% (03-16-18 @ 11:15) (91% - 98%)    PHYSICAL EXAMINATION:   Constitutional: WD, NAD  HEENT: NC, AT  Neck:  Supple  Respiratory:  Adequate airflow b/l. Not using accessory muscles of respiration.  Cardiovascular:  S1 & S2 intact, no R/G, 2+ radial pulses b/l  Gastrointestinal: Soft, NT, ND, normoactive b.s., no organomegaly/RT/rigidity  Extremities: improving rle pain/edema. FROM. WWP  Neurological:  Alert and awake.  No acute focal motor deficits. Crude sensation intact.     Labs and imaging reviewed    LABS:              PT/INR - ( 16 Mar 2018 06:15 )   PT: 11.9 sec;   INR: 1.09 ratio         PTT - ( 16 Mar 2018 06:15 )  PTT:32.1 sec    CAPILLARY BLOOD GLUCOSE                  RADIOLOGY & ADDITIONAL STUDIES:

## 2018-03-16 NOTE — PROGRESS NOTE ADULT - SUBJECTIVE AND OBJECTIVE BOX
==================PGY 1 Note===================   Discussed with supervising resident and primary attending    ================CHIEF COMPLAINT===============  Patient is a 67y old  Male who presents with a chief complaint of right lower leg pain (16 Mar 2018 21:28)        =========INTERVAL HPI/OVERNIGHT EVENTS=========  Offers no new complaints      ============CURRENT MEDICATIONS===============    MEDICATIONS  (STANDING):  enoxaparin Injectable 80 milliGRAM(s) SubCutaneous every 12 hours  pantoprazole    Tablet 40 milliGRAM(s) Oral before breakfast  sodium chloride 0.9%. 1000 milliLiter(s) (100 mL/Hr) IV Continuous <Continuous>  warfarin 7.5 milliGRAM(s) Oral once    MEDICATIONS  (PRN):  acetaminophen   Tablet. 650 milliGRAM(s) Oral every 6 hours PRN Mild Pain (1 - 3)        ============REVIEW OF SYSTEMS==================    CONSTITUTIONAL: No fever  EYES: no acute visual disturbances  NECK: No pain or stiffness  RESPIRATORY: No cough; No shortness of breath  CARDIOVASCULAR: No chest pain, no palpitations  GASTROINTESTINAL: No pain. No nausea or vomiting; No diarrhea   NEUROLOGICAL: No headache or numbness, no tremors  MUSCULOSKELETAL: No joint pain, no muscle pain  GENITOURINARY: no dysuria, no frequency, no hesitancy  PSYCHIATRY: no depression , no anxiety  ALL OTHER  ROS negative          ===============PHYSICAL EXAM====================    GENERAL: NAD  HEENT: Normocephalic;  conjunctivae and sclerae clear; moist mucous membranes;   NECK : supple  CHEST/LUNG: Clear to auscultation bilaterally with good air entry   HEART: S1 S2  regular; no murmurs, gallops or rubs  ABDOMEN: Soft, Nontender, Nondistended; Bowel sounds present  EXTREMITIES: no cyanosis; no edema; no calf tenderness  SKIN: warm and dry; no rash  NERVOUS SYSTEM:  Awake and alert; Oriented  to place, person and time    RADIOLOGY & ADDITIONAL TESTS:    Imaging Personally Reviewed:  YES    Consultant(s) Notes Reviewed:   YES    Care Discussed with Consultants : YES    Plan of care was discussed with patient  and /or primary care giver; all questions and concerns were addressed and care was aligned with patient's wishes. Time was allowed for questions that were answered to the best of my abilities

## 2018-03-16 NOTE — PROGRESS NOTE ADULT - PROBLEM SELECTOR PLAN 1
subtherapeutic inr  administer Coumadin 7.5 mg po qhs, c/w full dose Lovenox  goal inr 2-3  pt educated on proper nutrition including avoidance/limitation of vit k foods

## 2018-03-16 NOTE — PROGRESS NOTE ADULT - SUBJECTIVE AND OBJECTIVE BOX
Patient is a 67y old  Male who presents with a chief complaint of right lower leg pain (11 Mar 2018 04:42)       Subjective: No new c/o    MEDICATIONS  (STANDING):  enoxaparin Injectable 80 milliGRAM(s) SubCutaneous every 12 hours  pantoprazole    Tablet 40 milliGRAM(s) Oral before breakfast  sodium chloride 0.9%. 1000 milliLiter(s) (100 mL/Hr) IV Continuous <Continuous>  warfarin 7.5 milliGRAM(s) Oral once    MEDICATIONS  (PRN):  acetaminophen   Tablet. 650 milliGRAM(s) Oral every 6 hours PRN Mild Pain (1 - 3)  morphine  - Injectable 2 milliGRAM(s) IV Push every 6 hours PRN Moderate Pain (4 - 6)      Allergies    No Known Allergies    Intolerances        Vital Signs Last 24 Hrs  T(C): 36.7 (16 Mar 2018 15:52), Max: 37.8 (15 Mar 2018 23:25)  T(F): 98 (16 Mar 2018 15:52), Max: 100 (15 Mar 2018 23:25)  HR: 78 (16 Mar 2018 15:52) (63 - 96)  BP: 109/77 (16 Mar 2018 15:52) (100/68 - 117/70)  BP(mean): --  RR: 16 (16 Mar 2018 15:52) (16 - 18)  SpO2: 99% (16 Mar 2018 15:52) (91% - 99%)    PHYSICAL EXAM  General: adult in NAD  HEENT: clear oropharynx, anicteric sclera, pink conjunctiva  Neck: supple  CV: normal S1/S2 with no murmur rubs or gallops  Lungs: positive air movement b/l ant lungs,clear to auscultation, no wheezes, no rales  Abdomen: soft non-tender non-distended, no hepatosplenomegaly  Ext: no clubbing cyanosis or edema  Skin: no rashes and no petechiae  Neuro: alert and oriented X 4, no focal deficits  LABS:          Serial CBC's  03-13 @ 12:41  Hct-39.7 / Hgb-13.2 / Plat-205 / RBC-3.89 / WBC-5.3                    PT/INR - ( 16 Mar 2018 06:15 )   PT: 11.9 sec;   INR: 1.09 ratio         PTT - ( 16 Mar 2018 06:15 )  PTT:32.1 sec    Vitamin B12, Serum: 267 pg/mL (03-11 @ 21:39)  Folate, Serum: 5.9 ng/mL (03-11 @ 21:39)                          RADIOLOGY & ADDITIONAL STUDIES:

## 2018-03-16 NOTE — PROGRESS NOTE ADULT - ASSESSMENT
imp/rec    DVT refractory to Xarelto. Improved on Lovenox to coumadin. Re latter, INR has been flat and coumadin inc to 7.5. If INR still not increasing in AM, would inc to 12.5.    Macrocytosis. B12 was indeterminate but MMA is nml. Pt does not have tissue level B12 defic. Will reinquire re drinking.

## 2018-03-16 NOTE — PROGRESS NOTE ADULT - PROBLEM SELECTOR PLAN 1
Patient failed Xarelto  Coumadin 7.5 mg po qhs and full dose Lovenox until INR therapeutic  Unremarkable CT Abd/pelvis  Patient will need to continue follow up with outpatient Heme/Oncologist specialist and Primary Care for constant follow up of INR levels.  INR TODAY: 1.07  Heme/Onc Dr Shpepard

## 2018-03-16 NOTE — DISCHARGE NOTE ADULT - MEDICATION SUMMARY - MEDICATIONS TO TAKE
I will START or STAY ON the medications listed below when I get home from the hospital:    Coumadin 10 mg oral tablet  -- 1 tab(s) by mouth once a day   -- Do not take this drug if you are pregnant.  It is very important that you take or use this exactly as directed.  Do not skip doses or discontinue unless directed by your doctor.  Obtain medical advice before taking any non-prescription drugs as some may affect the action of this medication.    -- Indication: For DVT (deep venous thrombosis)

## 2018-03-17 LAB
APTT BLD: 32.9 SEC — SIGNIFICANT CHANGE UP (ref 27.5–37.4)
INR BLD: 1.17 RATIO — HIGH (ref 0.88–1.16)
PROTHROM AB SERPL-ACNC: 12.8 SEC — HIGH (ref 9.8–12.7)

## 2018-03-17 RX ORDER — WARFARIN SODIUM 2.5 MG/1
7.5 TABLET ORAL ONCE
Qty: 0 | Refills: 0 | Status: COMPLETED | OUTPATIENT
Start: 2018-03-17 | End: 2018-03-17

## 2018-03-17 RX ADMIN — PANTOPRAZOLE SODIUM 40 MILLIGRAM(S): 20 TABLET, DELAYED RELEASE ORAL at 06:08

## 2018-03-17 RX ADMIN — ENOXAPARIN SODIUM 80 MILLIGRAM(S): 100 INJECTION SUBCUTANEOUS at 17:30

## 2018-03-17 RX ADMIN — WARFARIN SODIUM 7.5 MILLIGRAM(S): 2.5 TABLET ORAL at 22:15

## 2018-03-17 RX ADMIN — ENOXAPARIN SODIUM 80 MILLIGRAM(S): 100 INJECTION SUBCUTANEOUS at 06:07

## 2018-03-17 NOTE — PROGRESS NOTE ADULT - SUBJECTIVE AND OBJECTIVE BOX
Patient seen and examined at bedside  No acute events noted overnight  Case discussed with medical team    HPI:  67 M with history of DVT started having right lower leg pain 2 months ago and seen at [facility] and diagnosed with DVT 3 weeks ago and started on Xarelto 15mg BID and today switched to Xarelto 20mg daily presented with right lower leg swelling getting worse. pt followed up with PCP for persistent swelling and pain and repeated doppler study outpatient which showed clot increased in size and was referred to go to ED. Patient denied fever, chest pain, palpitation, dyspnea, nausea, vomiting and any other symptoms and claimed his symptoms does not limit his ambulatory status.  In ED, doppler study with Above and below the knee thrombosis of the right lower extremity. (11 Mar 2018 04:42)      PAST MEDICAL & SURGICAL HISTORY:  DVT (deep venous thrombosis)  No significant past surgical history      No Known Allergies       MEDICATIONS  (STANDING):  enoxaparin Injectable 80 milliGRAM(s) SubCutaneous every 12 hours  pantoprazole    Tablet 40 milliGRAM(s) Oral before breakfast  sodium chloride 0.9%. 1000 milliLiter(s) (100 mL/Hr) IV Continuous <Continuous>  warfarin 7.5 milliGRAM(s) Oral once    MEDICATIONS  (PRN):  acetaminophen   Tablet. 650 milliGRAM(s) Oral every 6 hours PRN Mild Pain (1 - 3)  morphine  - Injectable 2 milliGRAM(s) IV Push every 6 hours PRN Moderate Pain (4 - 6)      REVIEW OF SYSTEMS:  CONSTITUTIONAL: (+) malaise.   EYES: No acute change in vision   ENT:  No tinnitus  NECK: No stiffness  RESPIRATORY: No hemoptysis  CARDIOVASCULAR: No chest pain, palpitations, syncope  GASTROINTESTINAL: No hematemesis, diarrhea, melena, or hematochezia.  GENITOURINARY: No hematuria  NEUROLOGICAL: No headaches  LYMPH Nodes: No enlarged glands  ENDOCRINE: No heat or cold intolerance	    T(C): 36.7 (03-17-18 @ 07:20), Max: 36.9 (03-16-18 @ 20:23)  HR: 64 (03-17-18 @ 07:20) (58 - 72)  BP: 113/67 (03-17-18 @ 07:20) (113/67 - 125/85)  RR: 17 (03-17-18 @ 07:20) (16 - 18)  SpO2: 95% (03-17-18 @ 07:20) (95% - 97%)    PHYSICAL EXAMINATION:   Constitutional: WD, NAD  HEENT: NC, AT  Neck:  Supple  Respiratory:  Adequate airflow b/l. Not using accessory muscles of respiration.  Cardiovascular:  S1 & S2 intact, no R/G, 2+ radial pulses b/l  Gastrointestinal: Soft, NT, ND, normoactive b.s., no organomegaly/RT/rigidity  Extremities: WWP  Neurological:  Alert and awake.  No acute focal motor deficits. Crude sensation intact.     Labs and imaging reviewed    LABS:              PT/INR - ( 17 Mar 2018 07:06 )   PT: 12.8 sec;   INR: 1.17 ratio         PTT - ( 17 Mar 2018 07:06 )  PTT:32.9 sec    CAPILLARY BLOOD GLUCOSE                  RADIOLOGY & ADDITIONAL STUDIES:

## 2018-03-17 NOTE — PROGRESS NOTE ADULT - PROBLEM SELECTOR PLAN 1
subtherapeutic inr  administer Coumadin 7.5 mg po qhs, c/w full dose Lovenox  goal inr 2-3  pt educated on proper nutrition including avoidance/limitation of vit k foods  d/c planning pending therapeutic inr

## 2018-03-17 NOTE — DIETITIAN INITIAL EVALUATION ADULT. - PROBLEM SELECTOR PLAN 1
-Doppler with right above and below knee clot  -unprovoked and failed outpatient Xarelto treatement  -failed outpatient Xarelto treatment  -will give Lovenox full dose  -initially tachy which improved. F/u cxr.  - f/u genetic work up for coagulation factor  -Hem/onc-  -Cardio-  -monitor clinical status, replace electrolytes prn, monitor vitals

## 2018-03-18 LAB
APTT BLD: 38.9 SEC — HIGH (ref 27.5–37.4)
INR BLD: 1.16 RATIO — SIGNIFICANT CHANGE UP (ref 0.88–1.16)
PROTHROM AB SERPL-ACNC: 12.7 SEC — SIGNIFICANT CHANGE UP (ref 9.8–12.7)

## 2018-03-18 RX ORDER — WARFARIN SODIUM 2.5 MG/1
7.5 TABLET ORAL ONCE
Qty: 0 | Refills: 0 | Status: COMPLETED | OUTPATIENT
Start: 2018-03-18 | End: 2018-03-18

## 2018-03-18 RX ADMIN — ENOXAPARIN SODIUM 80 MILLIGRAM(S): 100 INJECTION SUBCUTANEOUS at 17:06

## 2018-03-18 RX ADMIN — PANTOPRAZOLE SODIUM 40 MILLIGRAM(S): 20 TABLET, DELAYED RELEASE ORAL at 06:25

## 2018-03-18 RX ADMIN — WARFARIN SODIUM 7.5 MILLIGRAM(S): 2.5 TABLET ORAL at 21:24

## 2018-03-18 RX ADMIN — ENOXAPARIN SODIUM 80 MILLIGRAM(S): 100 INJECTION SUBCUTANEOUS at 06:25

## 2018-03-18 NOTE — PROGRESS NOTE ADULT - SUBJECTIVE AND OBJECTIVE BOX
Patient seen and examined at bedside  No acute events noted overnight  Case discussed with medical team    HPI:  67 M with history of DVT started having right lower leg pain 2 months ago and seen at [place] and diagnosed with DVT 3 weeks ago and started on Xarelto 15mg BID and today switched to Xarelto 20mg daily presented with right lower leg swelling getting worse. pt followed up with PCP for persistent swelling and pain and repeated doppler study outpatient which showed clot increased in size and was referred to go to ED. Patient denied fever, chest pain, palpitation, dyspnea, nausea, vomiting and any other symptoms and claimed his symptoms does not limit his ambulatory status.  In ED, doppler study with Above and below the knee thrombosis of the right lower extremity. (11 Mar 2018 04:42)      PAST MEDICAL & SURGICAL HISTORY:  DVT (deep venous thrombosis)  No significant past surgical history      No Known Allergies       MEDICATIONS  (STANDING):  enoxaparin Injectable 80 milliGRAM(s) SubCutaneous every 12 hours  pantoprazole    Tablet 40 milliGRAM(s) Oral before breakfast  sodium chloride 0.9%. 1000 milliLiter(s) (100 mL/Hr) IV Continuous <Continuous>  warfarin 7.5 milliGRAM(s) Oral once    MEDICATIONS  (PRN):  acetaminophen   Tablet. 650 milliGRAM(s) Oral every 6 hours PRN Mild Pain (1 - 3)  morphine  - Injectable 2 milliGRAM(s) IV Push every 6 hours PRN Moderate Pain (4 - 6)      REVIEW OF SYSTEMS:  CONSTITUTIONAL: (+) malaise.   EYES: No acute change in vision   ENT:  No tinnitus  NECK: No stiffness  RESPIRATORY: No hemoptysis  CARDIOVASCULAR: No chest pain, palpitations, syncope  GASTROINTESTINAL: No hematemesis, diarrhea, melena, or hematochezia.  GENITOURINARY: No hematuria  NEUROLOGICAL: No headaches  LYMPH Nodes: No enlarged glands  ENDOCRINE: No heat or cold intolerance	    T(C): 36.6 (03-18-18 @ 04:30), Max: 36.6 (03-17-18 @ 15:32)  HR: 53 (03-18-18 @ 04:30) (53 - 90)  BP: 125/81 (03-18-18 @ 04:30) (109/69 - 125/81)  RR: 18 (03-18-18 @ 04:30) (17 - 18)  SpO2: 98% (03-18-18 @ 04:30) (96% - 98%)    PHYSICAL EXAMINATION:   Constitutional: WD, NAD  HEENT: NC, AT  Neck:  Supple  Respiratory:  Adequate airflow b/l. Not using accessory muscles of respiration.  Cardiovascular:  S1 & S2 intact, no R/G, 2+ radial pulses b/l  Gastrointestinal: Soft, NT, ND, normoactive b.s., no organomegaly/RT/rigidity  Extremities: WWP  Neurological:  Alert and awake.  No acute focal motor deficits. Crude sensation intact.     Labs and imaging reviewed    LABS:              PT/INR - ( 18 Mar 2018 07:07 )   PT: 12.7 sec;   INR: 1.16 ratio         PTT - ( 18 Mar 2018 07:07 )  PTT:38.9 sec    CAPILLARY BLOOD GLUCOSE                  RADIOLOGY & ADDITIONAL STUDIES:

## 2018-03-18 NOTE — PROGRESS NOTE ADULT - ASSESSMENT
imp/rec    DVT refractory to Xarelto on Lovenox to coumadin. Leg is responding well to Lovenox w resolution of edema.  INR did not move at all today and will increase coumadin to 12.5 mg tonight.  Continue Lovenox.

## 2018-03-18 NOTE — PROGRESS NOTE ADULT - SUBJECTIVE AND OBJECTIVE BOX
Patient is a 67y old  Male who presents with a chief complaint of right lower leg pain (16 Mar 2018 21:28)       Subjective: No c/o    MEDICATIONS  (STANDING):  enoxaparin Injectable 80 milliGRAM(s) SubCutaneous every 12 hours  pantoprazole    Tablet 40 milliGRAM(s) Oral before breakfast  sodium chloride 0.9%. 1000 milliLiter(s) (100 mL/Hr) IV Continuous <Continuous>  warfarin 7.5 milliGRAM(s) Oral once    MEDICATIONS  (PRN):  acetaminophen   Tablet. 650 milliGRAM(s) Oral every 6 hours PRN Mild Pain (1 - 3)  morphine  - Injectable 2 milliGRAM(s) IV Push every 6 hours PRN Moderate Pain (4 - 6)      Allergies    No Known Allergies    Intolerances        Vital Signs Last 24 Hrs  T(C): 36.8 (18 Mar 2018 15:13), Max: 36.8 (18 Mar 2018 08:06)  T(F): 98.2 (18 Mar 2018 15:13), Max: 98.3 (18 Mar 2018 08:06)  HR: 76 (18 Mar 2018 15:13) (53 - 85)  BP: 113/74 (18 Mar 2018 15:13) (101/62 - 126/82)  BP(mean): --  RR: 18 (18 Mar 2018 15:13) (16 - 18)  SpO2: 98% (18 Mar 2018 15:13) (96% - 98%)    PHYSICAL EXAM  General: adult in NAD  HEENT: clear oropharynx, anicteric sclera, pink conjunctiva  Neck: supple  CV: normal S1/S2 with no murmur rubs or gallops  Lungs: positive air movement b/l ant lungs,clear to auscultation, no wheezes, no rales  Abdomen: soft non-tender non-distended, no hepatosplenomegaly  Ext: no clubbing cyanosis or edema  Skin: no rashes and no petechiae  Neuro: alert and oriented X 4, no focal deficits  LABS:                    PT/INR - ( 18 Mar 2018 07:07 )   PT: 12.7 sec;   INR: 1.16 ratio         PTT - ( 18 Mar 2018 07:07 )  PTT:38.9 sec                            RADIOLOGY & ADDITIONAL STUDIES:

## 2018-03-18 NOTE — PROGRESS NOTE ADULT - PROBLEM SELECTOR PLAN 1
subtherapeutic inr  administer Coumadin 7.5 mg po qhs, c/w full dose Lovenox  goal inr 2-3  pt educated on proper nutrition including avoidance/limitation of vit k foods  nutrition recs  ambulate as tolerated  d/c planning pending therapeutic inr 2-3

## 2018-03-19 LAB
APTT BLD: 40.1 SEC — HIGH (ref 27.5–37.4)
INR BLD: 1.36 RATIO — HIGH (ref 0.88–1.16)
PROTHROM AB SERPL-ACNC: 14.9 SEC — HIGH (ref 9.8–12.7)

## 2018-03-19 RX ORDER — WARFARIN SODIUM 2.5 MG/1
7.5 TABLET ORAL ONCE
Qty: 0 | Refills: 0 | Status: COMPLETED | OUTPATIENT
Start: 2018-03-19 | End: 2018-03-19

## 2018-03-19 RX ADMIN — WARFARIN SODIUM 7.5 MILLIGRAM(S): 2.5 TABLET ORAL at 21:13

## 2018-03-19 RX ADMIN — PANTOPRAZOLE SODIUM 40 MILLIGRAM(S): 20 TABLET, DELAYED RELEASE ORAL at 05:30

## 2018-03-19 RX ADMIN — ENOXAPARIN SODIUM 80 MILLIGRAM(S): 100 INJECTION SUBCUTANEOUS at 17:13

## 2018-03-19 RX ADMIN — ENOXAPARIN SODIUM 80 MILLIGRAM(S): 100 INJECTION SUBCUTANEOUS at 05:30

## 2018-03-19 NOTE — PROGRESS NOTE ADULT - PROBLEM SELECTOR PLAN 1
Patient failed Xarelto  Coumadin 7.5 mg po qhs and full dose Lovenox until INR theraupetic  Unremarkable CT Abd/pelvis  Patient will need to continue follow up with outpatient Heme/Oncologist specialist and Primary Care for constant follow up of INR levels.  Heme/Onc Dr Sheppard

## 2018-03-19 NOTE — PROGRESS NOTE ADULT - SUBJECTIVE AND OBJECTIVE BOX
Patient seen and examined at bedside  No acute events noted overnight  Case discussed with medical team    HPI:  67 M with history of DVT started having right lower leg pain 2 months ago and seen at [place] and diagnosed with DVT 3 weeks ago and started on Xarelto 15mg BID and today switched to Xarelto 20mg daily presented with right lower leg swelling getting worse. pt followed up with PCP for persistent swelling and pain and repeated doppler study outpatient which showed clot increased in size and was referred to go to ED. Patient denied fever, chest pain, palpitation, dyspnea, nausea, vomiting and any other symptoms and claimed his symptoms does not limit his ambulatory status.  In ED, doppler study with Above and below the knee thrombosis of the right lower extremity. (11 Mar 2018 04:42)      PAST MEDICAL & SURGICAL HISTORY:  DVT (deep venous thrombosis)  No significant past surgical history      No Known Allergies       MEDICATIONS  (STANDING):  enoxaparin Injectable 80 milliGRAM(s) SubCutaneous every 12 hours  pantoprazole    Tablet 40 milliGRAM(s) Oral before breakfast  sodium chloride 0.9%. 1000 milliLiter(s) (100 mL/Hr) IV Continuous <Continuous>  warfarin 7.5 milliGRAM(s) Oral once    MEDICATIONS  (PRN):  acetaminophen   Tablet. 650 milliGRAM(s) Oral every 6 hours PRN Mild Pain (1 - 3)  morphine  - Injectable 2 milliGRAM(s) IV Push every 6 hours PRN Moderate Pain (4 - 6)      REVIEW OF SYSTEMS:  CONSTITUTIONAL: (+) malaise.   EYES: No acute change in vision   ENT:  No tinnitus  NECK: No stiffness  RESPIRATORY: No hemoptysis  CARDIOVASCULAR: No chest pain, palpitations, syncope  GASTROINTESTINAL: No hematemesis, diarrhea, melena, or hematochezia.  GENITOURINARY: No hematuria  NEUROLOGICAL: No headaches  LYMPH Nodes: No enlarged glands  ENDOCRINE: No heat or cold intolerance	    Vital Signs Last 24 Hrs  T(C): 36.5 (19 Mar 2018 04:40), Max: 36.8 (18 Mar 2018 15:13)  T(F): 97.7 (19 Mar 2018 04:40), Max: 98.2 (18 Mar 2018 15:13)  HR: 64 (19 Mar 2018 04:40) (64 - 80)  BP: 106/64 (19 Mar 2018 04:40) (104/53 - 119/71)  BP(mean): --  RR: 18 (19 Mar 2018 04:40) (18 - 18)  SpO2: 97% (19 Mar 2018 04:40) (97% - 98%)    PHYSICAL EXAMINATION:   Constitutional: WD, NAD  HEENT: NC, AT  Neck:  Supple  Respiratory:  Adequate airflow b/l. Not using accessory muscles of respiration.  Cardiovascular:  S1 & S2 intact, no R/G, 2+ radial pulses b/l  Gastrointestinal: Soft, NT, ND, normoactive b.s., no organomegaly/RT/rigidity  Extremities: WWP  Neurological:  Alert and awake.  No acute focal motor deficits. Crude sensation intact.     Labs and imaging reviewed    LABS:    PT/INR - ( 19 Mar 2018 07:00 )   PT: 14.9 sec;   INR: 1.36 ratio         PTT - ( 19 Mar 2018 07:00 )  PTT:40.1 sec              RADIOLOGY & ADDITIONAL STUDIES:

## 2018-03-19 NOTE — PROGRESS NOTE ADULT - SUBJECTIVE AND OBJECTIVE BOX
Time of Visit:  Patient seen and examined.     MEDICATIONS  (STANDING):  enoxaparin Injectable 80 milliGRAM(s) SubCutaneous every 12 hours  pantoprazole    Tablet 40 milliGRAM(s) Oral before breakfast  sodium chloride 0.9%. 1000 milliLiter(s) (100 mL/Hr) IV Continuous <Continuous>  warfarin 7.5 milliGRAM(s) Oral once      MEDICATIONS  (PRN):  acetaminophen   Tablet. 650 milliGRAM(s) Oral every 6 hours PRN Mild Pain (1 - 3)     Medications up to date at time of exam.    PHYSICAL EXAMINATION:  Patient has no new complaints.  GENERAL: The patient is a well-developed, well-nourished, in no apparent distress.     Vital Signs Last 24 Hrs  T(C): 36.9 (19 Mar 2018 12:44), Max: 36.9 (19 Mar 2018 12:44)  T(F): 98.5 (19 Mar 2018 12:44), Max: 98.5 (19 Mar 2018 12:44)  HR: 76 (19 Mar 2018 12:44) (64 - 84)  BP: 109/74 (19 Mar 2018 12:44) (104/53 - 127/85)  BP(mean): --  RR: 18 (19 Mar 2018 12:44) (18 - 18)  SpO2: 98% (19 Mar 2018 12:44) (97% - 98%)   (if applicable)    Chest Tube (if applicable)    HEENT: Head is normocephalic and atraumatic.     NECK: Supple, no palpable adenopathy.    LUNGS: Clear to auscultation, no wheezing, rales, or rhonchi.    HEART: Regular rate and rhythm without murmur.    ABDOMEN: Soft, nontender, and nondistended.      EXTREMITIES: Without any cyanosis, clubbing, rash, lesions or edema.    NEUROLOGIC: Awake, alert.    SKIN: Warm, dry, good turgor.    LABS:          PT/INR - ( 19 Mar 2018 07:00 )   PT: 14.9 sec;   INR: 1.36 ratio         PTT - ( 19 Mar 2018 07:00 )  PTT:40.1 sec          MICROBIOLOGY: (if applicable)    RADIOLOGY & ADDITIONAL STUDIES:  EKG:   CXR:  ECHO:    IMPRESSION: 67y Male PAST MEDICAL & SURGICAL HISTORY:  DVT (deep venous thrombosis)  No significant past surgical history       Impression; 68 Y/O Male was sent from [facility] for evaluation of increased size of R leg DVT, while on xarelto. Patient also found to have PE at [facility].     Patient clinically improved.     Suggestion:   - Patient clinically improved, RLE edema improving   - lovenox to coumadin   - GI prophylaxis.    - OOB as tolerated

## 2018-03-19 NOTE — PROGRESS NOTE ADULT - SUBJECTIVE AND OBJECTIVE BOX
==================PGY 1 Note===================   Discussed with supervising resident and primary attending    ================CHIEF COMPLAINT===============  Patient is a 67y old  Male who presents with a chief complaint of right lower leg pain (16 Mar 2018 21:28)        =========INTERVAL HPI/OVERNIGHT EVENTS=========  Offers no new complaints      ============CURRENT MEDICATIONS===============    MEDICATIONS  (STANDING):  enoxaparin Injectable 80 milliGRAM(s) SubCutaneous every 12 hours  pantoprazole    Tablet 40 milliGRAM(s) Oral before breakfast  sodium chloride 0.9%. 1000 milliLiter(s) (100 mL/Hr) IV Continuous <Continuous>  warfarin 7.5 milliGRAM(s) Oral once    MEDICATIONS  (PRN):  acetaminophen   Tablet. 650 milliGRAM(s) Oral every 6 hours PRN Mild Pain (1 - 3)        ============REVIEW OF SYSTEMS==================    CONSTITUTIONAL: No fever  EYES: no acute visual disturbances  NECK: No pain or stiffness  RESPIRATORY: No cough; No shortness of breath  CARDIOVASCULAR: No chest pain, no palpitations  GASTROINTESTINAL: No pain. No nausea or vomiting; No diarrhea   NEUROLOGICAL: No headache or numbness, no tremors  MUSCULOSKELETAL: No joint pain, no muscle pain  GENITOURINARY: no dysuria, no frequency, no hesitancy  PSYCHIATRY: no depression , no anxiety  ALL OTHER  ROS negative      ================VITALS SIGNS=====================  Vital Signs Last 24 Hrs  T(C): 36.9 (19 Mar 2018 12:44), Max: 36.9 (19 Mar 2018 12:44)  T(F): 98.5 (19 Mar 2018 12:44), Max: 98.5 (19 Mar 2018 12:44)  HR: 76 (19 Mar 2018 12:44) (64 - 84)  BP: 109/74 (19 Mar 2018 12:44) (104/53 - 127/85)  BP(mean): --  RR: 18 (19 Mar 2018 12:44) (18 - 18)  SpO2: 98% (19 Mar 2018 12:44) (97% - 98%)    ===============PHYSICAL EXAM====================    GENERAL: NAD  HEENT: Normocephalic;  conjunctivae and sclerae clear; moist mucous membranes;   NECK : supple  CHEST/LUNG: Clear to auscultation bilaterally with good air entry   HEART: S1 S2  regular; no murmurs, gallops or rubs  ABDOMEN: Soft, Nontender, Nondistended; Bowel sounds present  EXTREMITIES: no cyanosis; no edema; no calf tenderness  SKIN: warm and dry; no rash  NERVOUS SYSTEM:  Awake and alert; Oriented  to place, person and time    ==============LABORATORIES======================  LABS:          PT/INR - ( 19 Mar 2018 07:00 )   PT: 14.9 sec;   INR: 1.36 ratio         PTT - ( 19 Mar 2018 07:00 )  PTT:40.1 sec    CAPILLARY BLOOD GLUCOSE          =============INPUTS/OUPUTS=====================    03-18-18 @ 07:01  -  03-19-18 @ 07:00  --------------------------------------------------------  IN: 436 mL / OUT: 0 mL / NET: 436 mL          RADIOLOGY & ADDITIONAL TESTS:    Imaging Personally Reviewed:  YES    Consultant(s) Notes Reviewed:   YES    Care Discussed with Consultants : YES    Plan of care was discussed with patient  and /or primary care giver; all questions and concerns were addressed and care was aligned with patient's wishes. Time was allowed for questions that were answered to the best of my abilities

## 2018-03-19 NOTE — PROGRESS NOTE ADULT - SUBJECTIVE AND OBJECTIVE BOX
INR up to 1.3+ today which is an appropriate move. Apparently received 7.5 mg yesterday and this dose can be continued. Lovenox shld be maintained INR is over 2 for 2 consec days.  Will f/u prn from this point. Please recall if problems persist or arise.

## 2018-03-20 DIAGNOSIS — Z71.3 DIETARY COUNSELING AND SURVEILLANCE: ICD-10-CM

## 2018-03-20 LAB
APTT BLD: 38.2 SEC — HIGH (ref 27.5–37.4)
DNA PLOIDY SPEC FC-IMP: SIGNIFICANT CHANGE UP
INR BLD: 1.42 RATIO — HIGH (ref 0.88–1.16)
PROTHROM AB SERPL-ACNC: 15.6 SEC — HIGH (ref 9.8–12.7)

## 2018-03-20 RX ORDER — WARFARIN SODIUM 2.5 MG/1
10 TABLET ORAL ONCE
Qty: 0 | Refills: 0 | Status: COMPLETED | OUTPATIENT
Start: 2018-03-20 | End: 2018-03-20

## 2018-03-20 RX ADMIN — ENOXAPARIN SODIUM 80 MILLIGRAM(S): 100 INJECTION SUBCUTANEOUS at 17:25

## 2018-03-20 RX ADMIN — PANTOPRAZOLE SODIUM 40 MILLIGRAM(S): 20 TABLET, DELAYED RELEASE ORAL at 06:36

## 2018-03-20 RX ADMIN — WARFARIN SODIUM 10 MILLIGRAM(S): 2.5 TABLET ORAL at 21:11

## 2018-03-20 RX ADMIN — ENOXAPARIN SODIUM 80 MILLIGRAM(S): 100 INJECTION SUBCUTANEOUS at 06:36

## 2018-03-20 NOTE — PROGRESS NOTE ADULT - SUBJECTIVE AND OBJECTIVE BOX
Patient seen and examined.   Time of visit:    MEDICATIONS  (STANDING):  enoxaparin Injectable 80 milliGRAM(s) SubCutaneous every 12 hours  pantoprazole    Tablet 40 milliGRAM(s) Oral before breakfast  sodium chloride 0.9%. 1000 milliLiter(s) (100 mL/Hr) IV Continuous <Continuous>  warfarin 10 milliGRAM(s) Oral once      MEDICATIONS  (PRN):  acetaminophen   Tablet. 650 milliGRAM(s) Oral every 6 hours PRN Mild Pain (1 - 3)   Medications up to date at time of exam.      PHYSICAL EXAMINATION:  Patient has no new complaints.  GENERAL: The patient is a well-developed, well-nourished, in no apparent distress.     Vital Signs Last 24 Hrs  T(C): 36.6 (20 Mar 2018 15:59), Max: 37.1 (20 Mar 2018 11:19)  T(F): 97.9 (20 Mar 2018 15:59), Max: 98.7 (20 Mar 2018 11:19)  HR: 71 (20 Mar 2018 15:59) (63 - 87)  BP: 104/70 (20 Mar 2018 15:59) (104/70 - 142/86)  BP(mean): --  RR: 18 (20 Mar 2018 15:59) (16 - 19)  SpO2: 98% (20 Mar 2018 15:59) (96% - 99%)   (if applicable)      HEENT: Head is normocephalic and atraumatic.     NECK: Supple, no palpable adenopathy.    LUNGS: Clear to auscultation, no wheezing, rales, or rhonchi.    HEART: Regular rate and rhythm without murmur.    ABDOMEN: Soft, nontender, and nondistended.  No hepatosplenomegaly is noted.    EXTREMITIES: Without any cyanosis, clubbing, rash, lesions or edema.    NEUROLOGIC: Awake, alert.    SKIN: Warm, dry, good turgor.      LABS:          PT/INR - ( 20 Mar 2018 07:08 )   PT: 15.6 sec;   INR: 1.42 ratio         PTT - ( 20 Mar 2018 07:08 )  PTT:38.2 sec                    MICROBIOLOGY: (if applicable)    RADIOLOGY & ADDITIONAL STUDIES:  EKG:   CXR:  ECHO:    IMPRESSION: 67y Male PAST MEDICAL & SURGICAL HISTORY:  DVT (deep venous thrombosis)  No significant past surgical history     RECOMMENDATIONS:      2D echo shows normal LV function. Patient noted with episodes of ST and SB, EP consulted, no intervention.  BP appears well controlled.  Tele d/c'd  INR still not therapeutic despite increasing doses of coumadin, heme following.  DVT and GI prophylaxis.

## 2018-03-20 NOTE — PROGRESS NOTE ADULT - ATTENDING COMMENTS
Agree with above assessment and plan as transcribed
Agree with above assessment and plan as transcribed.
I agree with the above info  changes made as needed  of note, increase coumadin to 7.5, f/u inr  goal inr 2-3
I agree with the above information  changes made above as needed  of note, c/w coumadin 5 mg po qhs and full dose lovenox  goal inr 2-3  analgesics prn
I agree with the above information, changes made above as needed  in addition, extensive nutrition counseling provided to the patient, particularly with vitamin k containing foods/liquids and its interaction with warfarin/coumadin  increase coumadin dosage  f/u inr  goal inr 2 - 3

## 2018-03-20 NOTE — PROGRESS NOTE ADULT - SUBJECTIVE AND OBJECTIVE BOX
Time of Visit:  Patient seen and examined.     MEDICATIONS  (STANDING):  enoxaparin Injectable 80 milliGRAM(s) SubCutaneous every 12 hours  pantoprazole    Tablet 40 milliGRAM(s) Oral before breakfast  sodium chloride 0.9%. 1000 milliLiter(s) (100 mL/Hr) IV Continuous <Continuous>  warfarin 10 milliGRAM(s) Oral once      MEDICATIONS  (PRN):  acetaminophen   Tablet. 650 milliGRAM(s) Oral every 6 hours PRN Mild Pain (1 - 3)       Medications up to date at time of exam.    ROS: No fever, chills, cough, congestion.   PHYSICAL EXAMINATION:  Vital Signs Last 24 Hrs  T(C): 37.1 (20 Mar 2018 11:19), Max: 37.1 (20 Mar 2018 11:19)  T(F): 98.7 (20 Mar 2018 11:19), Max: 98.7 (20 Mar 2018 11:19)  HR: 87 (20 Mar 2018 11:19) (63 - 87)  BP: 142/86 (20 Mar 2018 11:19) (106/63 - 142/86)  BP(mean): --  RR: 16 (20 Mar 2018 11:19) (16 - 19)  SpO2: 99% (20 Mar 2018 11:19) (96% - 99%)   (if applicable)    General; Alert and oriented. No acute distress. Able to answer questions appropriately with no SOB.    HEENT: Normocephalic and atraumatic. Extraocular muscles are intact. Moist mucosa.     NECK: Supple, no palpable adenopathy.    LUNGS: Clear to auscultation, no wheezing, rales, or rhonchi. No use of accessory muscle.    HEART: S1 S2 Regular rate and no click/ rub.    ABDOMEN: Soft, nontender, and nondistended.  No hepatosplenomegaly is noted. Active bowel sounds.    EXTREMITIES: Without any cyanosis, clubbing, rash, lesions .     NEUROLOGIC: Awake, alert, oriented.     SKIN: Warm and moist. Non diaphoretic.      LABS:                     LABS:          PT/INR - ( 20 Mar 2018 07:08 )   PT: 15.6 sec;   INR: 1.42 ratio         PTT - ( 20 Mar 2018 07:08 )  PTT:38.2 sec    RADIOLOGY & ADDITIONAL STUDIES:  EKG:   CXR: < from: Xray Chest 1 View- PORTABLE-Urgent (03.11.18 @ 09:32) >  INTERPRETATION:  CLINICAL INFORMATION: Abnormal chest sounds.    TECHNIQUE: Frontal view of the chest   COMPARISON: None.    FINDINGS:    LUNGS/PLEURA: No focal consolidation, pleural effusion, or pneumothorax.  MEDIASTINUM: Cardiomediastinal silhouette is unremarkable.  OTHER: Old right-sided rib fractures.    IMPRESSION:     No focal consolidation or pleural effusion.    PAST MEDICAL & SURGICAL HISTORY:  DVT (deep venous thrombosis)  No significant past surgical history      Impression; 66 Y/O Male was sent from [facility] for evaluation of increased size of R leg DVT, while on xarelto. Patient also found to have "mild PE" at [facility]. O2 saturation 98% room air on exam. Failed Xarelto and now on Coumadin and Lovenox SQ.    Suggestion:  Oxygen supplementation if needed to keep O2 saturation >90%, O 2saturation 98% room air.   Continue Lovenox 80 mg SQ and INR 1.42 [id number] mg.  GI prophylaxis.   No need for respiratory Tx at this time.

## 2018-03-20 NOTE — PROGRESS NOTE ADULT - SUBJECTIVE AND OBJECTIVE BOX
==================PGY 1 Note===================   Discussed with supervising resident and primary attending    ================CHIEF COMPLAINT===============  Patient is a 67y old  Male who presents with a chief complaint of right lower leg pain (16 Mar 2018 21:28)        =========INTERVAL HPI/OVERNIGHT EVENTS=========  Offers no new complaints      ============CURRENT MEDICATIONS===============    MEDICATIONS  (STANDING):  enoxaparin Injectable 80 milliGRAM(s) SubCutaneous every 12 hours  pantoprazole    Tablet 40 milliGRAM(s) Oral before breakfast  sodium chloride 0.9%. 1000 milliLiter(s) (100 mL/Hr) IV Continuous <Continuous>    MEDICATIONS  (PRN):  acetaminophen   Tablet. 650 milliGRAM(s) Oral every 6 hours PRN Mild Pain (1 - 3)        ============REVIEW OF SYSTEMS==================    CONSTITUTIONAL: No fever  EYES: no acute visual disturbances  NECK: No pain or stiffness  RESPIRATORY: No cough; No shortness of breath  CARDIOVASCULAR: No chest pain, no palpitations  GASTROINTESTINAL: No pain. No nausea or vomiting; No diarrhea   NEUROLOGICAL: No headache or numbness, no tremors  MUSCULOSKELETAL: No joint pain, no muscle pain  GENITOURINARY: no dysuria, no frequency, no hesitancy  PSYCHIATRY: no depression , no anxiety  ALL OTHER  ROS negative      ================VITALS SIGNS=====================  Vital Signs Last 24 Hrs  T(C): 37 (20 Mar 2018 07:42), Max: 37 (20 Mar 2018 07:42)  T(F): 98.6 (20 Mar 2018 07:42), Max: 98.6 (20 Mar 2018 07:42)  HR: 65 (20 Mar 2018 07:42) (63 - 84)  BP: 107/75 (20 Mar 2018 07:42) (106/63 - 127/85)  BP(mean): --  RR: 16 (20 Mar 2018 07:42) (16 - 19)  SpO2: 98% (20 Mar 2018 07:42) (96% - 98%)    ===============PHYSICAL EXAM====================    GENERAL: NAD  HEENT: Normocephalic;  conjunctivae and sclerae clear; moist mucous membranes;   NECK : supple  CHEST/LUNG: Clear to auscultation bilaterally with good air entry   HEART: S1 S2  regular; no murmurs, gallops or rubs  ABDOMEN: Soft, Nontender, Nondistended; Bowel sounds present  EXTREMITIES: no cyanosis; no edema; no calf tenderness  SKIN: warm and dry; no rash  NERVOUS SYSTEM:  Awake and alert; Oriented  to place, person and time    ==============LABORATORIES======================  LABS:          PT/INR - ( 20 Mar 2018 07:08 )   PT: 15.6 sec;   INR: 1.42 ratio         PTT - ( 20 Mar 2018 07:08 )  PTT:38.2 sec    CAPILLARY BLOOD GLUCOSE          =============INPUTS/OUPUTS=====================        RADIOLOGY & ADDITIONAL TESTS:    Imaging Personally Reviewed:  YES    Consultant(s) Notes Reviewed:   YES    Care Discussed with Consultants : YES    Plan of care was discussed with patient  and /or primary care giver; all questions and concerns were addressed and care was aligned with patient's wishes. Time was allowed for questions that were answered to the best of my abilities

## 2018-03-21 DIAGNOSIS — R79.1 ABNORMAL COAGULATION PROFILE: ICD-10-CM

## 2018-03-21 LAB
APTT BLD: 35.7 SEC — SIGNIFICANT CHANGE UP (ref 27.5–37.4)
INR BLD: 1.53 RATIO — HIGH (ref 0.88–1.16)
PROTHROM AB SERPL-ACNC: 16.8 SEC — HIGH (ref 9.8–12.7)

## 2018-03-21 RX ORDER — WARFARIN SODIUM 2.5 MG/1
10 TABLET ORAL ONCE
Qty: 0 | Refills: 0 | Status: DISCONTINUED | OUTPATIENT
Start: 2018-03-21 | End: 2018-03-21

## 2018-03-21 RX ORDER — WARFARIN SODIUM 2.5 MG/1
12.5 TABLET ORAL ONCE
Qty: 0 | Refills: 0 | Status: COMPLETED | OUTPATIENT
Start: 2018-03-21 | End: 2018-03-21

## 2018-03-21 RX ADMIN — PANTOPRAZOLE SODIUM 40 MILLIGRAM(S): 20 TABLET, DELAYED RELEASE ORAL at 06:32

## 2018-03-21 RX ADMIN — ENOXAPARIN SODIUM 80 MILLIGRAM(S): 100 INJECTION SUBCUTANEOUS at 06:32

## 2018-03-21 RX ADMIN — WARFARIN SODIUM 12.5 MILLIGRAM(S): 2.5 TABLET ORAL at 21:21

## 2018-03-21 RX ADMIN — ENOXAPARIN SODIUM 80 MILLIGRAM(S): 100 INJECTION SUBCUTANEOUS at 17:17

## 2018-03-21 NOTE — PROGRESS NOTE ADULT - PROBLEM SELECTOR PLAN 2
Discussed with Cardiology NP  Electrophysiology consulted by Cardiology.  EPS recommendations Reviewed. Sinus rhythm no need for EPS
improving   c/w analgesics prn  treat underlying dvt
Analgesics prn  treat underlying dvt
Discussed with Cardiology NP  Electrophysiology consulted by Cardiology
Discussed with Cardiology NP  Electrophysiology consulted by Cardiology.  EPS recommendations Reviewed. Sinus rhythm no need for EPS
as above
improving   c/w analgesics prn  treat underlying dvt
Discussed with Cardiology NP  Electrophysiology consulted by Cardiology.  EPS recommendations Reviewed. Sinus rhythm no need for EPS
IMPROVE SCORE 4  from: US Duplex Venous Lower Ext Ltd, Right (03.10.18 @ 19:19 --Above and below the knee thrombosis of the right lower extremity.  continue on full dose Lovenox -- changed to q12 dosing    continue protonix
Discussed with Cardiology NP  Electrophysiology consulted by Cardiology.  EPS recommendations Reviewed. Sinus rhythm no need for EPS

## 2018-03-21 NOTE — PROGRESS NOTE ADULT - PROBLEM SELECTOR PLAN 4
improving  unremarkable tte
on imaging  low fat diet, monitor clinical status
already on full dose lovenox with coumadin bridging
already on full dose lovenox with coumadin bridging
improving  unremarkable tte
nutrition teaching
on full dose lovenox
on full dose lovenox
on full dose lovenox and coumadin
on imaging  low fat diet, monitor clinical status
improving  unremarkable tte
improving  unremarkable tte

## 2018-03-21 NOTE — PROGRESS NOTE ADULT - SUBJECTIVE AND OBJECTIVE BOX
==================PGY 1 Note===================   Discussed with supervising resident and primary attending    ================CHIEF COMPLAINT===============  Patient is a 67y old  Male who presents with a chief complaint of right lower leg pain (16 Mar 2018 21:28)        =========INTERVAL HPI/OVERNIGHT EVENTS=========  Offers no new complaints      ============CURRENT MEDICATIONS===============    MEDICATIONS  (STANDING):  enoxaparin Injectable 80 milliGRAM(s) SubCutaneous every 12 hours  pantoprazole    Tablet 40 milliGRAM(s) Oral before breakfast  sodium chloride 0.9%. 1000 milliLiter(s) (100 mL/Hr) IV Continuous <Continuous>    MEDICATIONS  (PRN):  acetaminophen   Tablet. 650 milliGRAM(s) Oral every 6 hours PRN Mild Pain (1 - 3)        ============REVIEW OF SYSTEMS==================    CONSTITUTIONAL: No fever  EYES: no acute visual disturbances  NECK: No pain or stiffness  RESPIRATORY: No cough; No shortness of breath  CARDIOVASCULAR: No chest pain, no palpitations  GASTROINTESTINAL: No pain. No nausea or vomiting; No diarrhea   NEUROLOGICAL: No headache or numbness, no tremors  MUSCULOSKELETAL: No joint pain, no muscle pain  GENITOURINARY: no dysuria, no frequency, no hesitancy  PSYCHIATRY: no depression , no anxiety  ALL OTHER  ROS negative      ================VITALS SIGNS=====================  Vital Signs Last 24 Hrs  T(C): 36.8 (21 Mar 2018 07:39), Max: 37.1 (20 Mar 2018 11:19)  T(F): 98.2 (21 Mar 2018 07:39), Max: 98.7 (20 Mar 2018 11:19)  HR: 89 (21 Mar 2018 07:39) (56 - 89)  BP: 118/76 (21 Mar 2018 07:39) (104/70 - 142/86)  BP(mean): --  RR: 16 (21 Mar 2018 07:39) (16 - 18)  SpO2: 97% (21 Mar 2018 07:39) (96% - 99%)    ===============PHYSICAL EXAM====================    GENERAL: NAD  HEENT: Normocephalic;  conjunctivae and sclerae clear; moist mucous membranes;   NECK : supple  CHEST/LUNG: Clear to auscultation bilaterally with good air entry   HEART: S1 S2  regular; no murmurs, gallops or rubs  ABDOMEN: Soft, Nontender, Nondistended; Bowel sounds present  EXTREMITIES: no cyanosis; no edema; no calf tenderness  SKIN: warm and dry; no rash  NERVOUS SYSTEM:  Awake and alert; Oriented  to place, person and time    ==============LABORATORIES======================  LABS:          PT/INR - ( 21 Mar 2018 07:13 )   PT: 16.8 sec;   INR: 1.53 ratio         PTT - ( 21 Mar 2018 07:13 )  PTT:35.7 sec    CAPILLARY BLOOD GLUCOSE          =============INPUTS/OUPUTS=====================    03-20-18 @ 07:01  -  03-21-18 @ 07:00  --------------------------------------------------------  IN: 225 mL / OUT: 0 mL / NET: 225 mL          RADIOLOGY & ADDITIONAL TESTS:    Imaging Personally Reviewed:  YES    Consultant(s) Notes Reviewed:   YES    Care Discussed with Consultants : YES    Plan of care was discussed with patient  and /or primary care giver; all questions and concerns were addressed and care was aligned with patient's wishes. Time was allowed for questions that were answered to the best of my abilities

## 2018-03-21 NOTE — PROGRESS NOTE ADULT - PROBLEM SELECTOR PROBLEM 4
Cholelithiases
Dyspnea on exertion
Cholelithiases
Dyspnea on exertion
Need for prophylactic measure
Nutritional counseling
Dyspnea on exertion
Dyspnea on exertion

## 2018-03-21 NOTE — PROGRESS NOTE ADULT - PROBLEM SELECTOR PLAN 1
awaiting inr  c/w full dose lovenox and coumadin bridging  goal inr 2 - 3  d/c planning when therapeutic inr  nutrition counseling

## 2018-03-21 NOTE — PROGRESS NOTE ADULT - SUBJECTIVE AND OBJECTIVE BOX
Patient seen and examined at bedside  No acute events noted overnight  Case discussed with medical team    HPI:  67 M with history of DVT started having right lower leg pain 2 months ago and [facility] and diagnosed with DVT 3 weeks ago and started on Xarelto 15mg BID and today switched to Xarelto 20mg daily presented with right lower leg swelling getting worse. pt followed up with PCP for persistent swelling and pain and repeated doppler study outpatient which showed clot increased in size and was referred to go to ED. Patient denied fever, chest pain, palpitation, dyspnea, nausea, vomiting and any other symptoms and claimed his symptoms does not limit his ambulatory status.  In ED, doppler study with Above and below the knee thrombosis of the right lower extremity. (11 Mar 2018 04:42)      PAST MEDICAL & SURGICAL HISTORY:  DVT (deep venous thrombosis)  No significant past surgical history      No Known Allergies       MEDICATIONS  (STANDING):  enoxaparin Injectable 80 milliGRAM(s) SubCutaneous every 12 hours  pantoprazole    Tablet 40 milliGRAM(s) Oral before breakfast  sodium chloride 0.9%. 1000 milliLiter(s) (100 mL/Hr) IV Continuous <Continuous>    MEDICATIONS  (PRN):  acetaminophen   Tablet. 650 milliGRAM(s) Oral every 6 hours PRN Mild Pain (1 - 3)      REVIEW OF SYSTEMS:  CONSTITUTIONAL: (+) malaise.   EYES: No acute change in vision   ENT:  No tinnitus  NECK: No stiffness  RESPIRATORY: No hemoptysis  CARDIOVASCULAR: No chest pain, palpitations, syncope  GASTROINTESTINAL: No hematemesis, diarrhea, melena, or hematochezia.  GENITOURINARY: No hematuria  NEUROLOGICAL: No headaches  LYMPH Nodes: No enlarged glands  ENDOCRINE: No heat or cold intolerance	    T(C): 36.7 (03-21-18 @ 04:30), Max: 37.1 (03-20-18 @ 11:19)  HR: 56 (03-21-18 @ 04:30) (56 - 87)  BP: 106/66 (03-21-18 @ 04:30) (104/70 - 142/86)  RR: 18 (03-21-18 @ 04:30) (16 - 18)  SpO2: 96% (03-21-18 @ 04:30) (96% - 99%)    PHYSICAL EXAMINATION:   Constitutional: WD, NAD  HEENT: NC, AT  Neck:  Supple  Respiratory:  Adequate airflow b/l. Not using accessory muscles of respiration.  Cardiovascular:  S1 & S2 intact, no R/G, 2+ radial pulses b/l  Gastrointestinal: Soft, NT, ND, normoactive b.s., no organomegaly/RT/rigidity  Extremities: WWP  Neurological:  Alert and awake.  No acute focal motor deficits. Crude sensation intact.     Labs and imaging reviewed    LABS:              PT/INR - ( 20 Mar 2018 07:08 )   PT: 15.6 sec;   INR: 1.42 ratio         PTT - ( 20 Mar 2018 07:08 )  PTT:38.2 sec    CAPILLARY BLOOD GLUCOSE                  RADIOLOGY & ADDITIONAL STUDIES:

## 2018-03-21 NOTE — PROGRESS NOTE ADULT - PROBLEM SELECTOR PLAN 1
Patient failed Xarelto  Coumadin 10 mg po qhs and full dose Lovenox until INR therapeutic  Unremarkable CT Abd/pelvis  Patient will need to continue follow up with outpatient Heme/Oncologist specialist and Primary Care for constant follow up of INR levels.  INR in AM: 1.53 GOAL : 2-3  Heme/Onc Dr Sheppard Patient failed Xarelto  Coumadin 12.5 mg po qhs and full dose Lovenox until INR therapeutic  Unremarkable CT Abd/pelvis  Patient will need to continue follow up with outpatient Heme/Oncologist specialist and Primary Care for constant follow up of INR levels.  INR in AM: 1.53 GOAL : 2-3  Heme/Onc Dr Sheppard

## 2018-03-21 NOTE — PROGRESS NOTE ADULT - PROBLEM SELECTOR PROBLEM 2
Pain
Tachy-danuta syndrome
Pain
Subtherapeutic international normalized ratio (INR)
Tachy-danuta syndrome
Need for prophylactic measure
Tachy-danuta syndrome
Tachy-danuta syndrome

## 2018-03-21 NOTE — PROGRESS NOTE ADULT - PROBLEM SELECTOR PLAN 6
IMPROVE VTE score: 3  [ ] Previous VTE                                    3  [x ] Thrombophilia                                  2  [ ] Lower limb paralysis                        2  (unable to hold up >15 seconds)    [ ] Current Cancer (within 6 months)        2   [x] Immobilization > 24 hrs                    1  [] ICU/CCU stay > 24 hrs                      1  [] Age > 60                                         1    Full dose Lovenox and coumadin

## 2018-03-21 NOTE — PROGRESS NOTE ADULT - SUBJECTIVE AND OBJECTIVE BOX
Patient seen and examined.   Time of visit:    MEDICATIONS  (STANDING):  enoxaparin Injectable 80 milliGRAM(s) SubCutaneous every 12 hours  pantoprazole    Tablet 40 milliGRAM(s) Oral before breakfast  sodium chloride 0.9%. 1000 milliLiter(s) (100 mL/Hr) IV Continuous <Continuous>  warfarin 12.5 milliGRAM(s) Oral once      MEDICATIONS  (PRN):  acetaminophen   Tablet. 650 milliGRAM(s) Oral every 6 hours PRN Mild Pain (1 - 3)   Medications up to date at time of exam.      PHYSICAL EXAMINATION:  Patient has no new complaints.  GENERAL: The patient is a well-developed, well-nourished, in no apparent distress.     Vital Signs Last 24 Hrs  T(C): 36.8 (21 Mar 2018 07:39), Max: 37.1 (20 Mar 2018 11:19)  T(F): 98.2 (21 Mar 2018 07:39), Max: 98.7 (20 Mar 2018 11:19)  HR: 89 (21 Mar 2018 07:39) (56 - 89)  BP: 118/76 (21 Mar 2018 07:39) (104/70 - 142/86)  BP(mean): --  RR: 16 (21 Mar 2018 07:39) (16 - 18)  SpO2: 97% (21 Mar 2018 07:39) (96% - 99%)   (if applicable)      HEENT: Head is normocephalic and atraumatic.     NECK: Supple, no palpable adenopathy.    LUNGS: Clear to auscultation, no wheezing, rales, or rhonchi.    HEART: Regular rate and rhythm without murmur.    ABDOMEN: Soft, nontender, and nondistended.  No hepatosplenomegaly is noted.    EXTREMITIES: Without any cyanosis, clubbing, rash, lesions or edema.    NEUROLOGIC: Awake, alert.    SKIN: Warm, dry, good turgor.      LABS:          PT/INR - ( 21 Mar 2018 07:13 )   PT: 16.8 sec;   INR: 1.53 ratio         PTT - ( 21 Mar 2018 07:13 )  PTT:35.7 sec                    MICROBIOLOGY: (if applicable)    RADIOLOGY & ADDITIONAL STUDIES:  EKG:   CXR:  ECHO:  < from: Transthoracic Echocardiogram (03.12.18 @ 07:23) >    Patient name: HORACIO STEPHENSON  YOB: 1950   Age: 67 (M)   MR#: [id number]  Study Date: 3/12/2018  Location: [address] [place], [place] [address]onographer: Rebecca Hidalgo [facility]  Study quality: Fair  Referring Physician:  GENI BEE MD  Blood Pressure:119/80 mmHg  Height: 175 cm  Weight: 78 kg  BSA: 1.9 m2  ------------------------------------------------------------------------    PROCEDURE: Transthoracic echocardiogram with 2-D, M-Mode  and complete spectral and color flow Doppler.  INDICATION: Chest pain, unspecified (R07.9)  HISTORY:  ------------------------------------------------------------------------  DIMENSIONS:  Dimensions:     Normal Values:  LA:     4.0 cm    2.0 - 4.0 cm  Ao:     4.3 cm    2.0 - 3.8 cm  SEPTUM: 1.1 cm    0.6 - 1.2 cm  PWT:    1.0 cm    0.6 - 1.1 cm  LVIDd:  5.2 cm    3.0 - 5.6 cm  LVIDs:  3.5 cm    1.8 - 4.0 cm      Derived Variables:  LVMI: 107 g/m2  RWT: 0.38    ------------------------------------------------------------------------  OBSERVATIONS:  MitralValve: Normal mitral valve. Trace mitral  regurgitation.  Aortic Root: Normal aortic root.  Aortic Valve: Probably trileaflet aortic valve is not well  seen. No aortic stenosis. No aortic valve regurgitation  seen.  Left Atrium: Normal left atrium.  LA volume index = 29  cc/m2.  Left Ventricle: Low-nomal Left Ventricular Systolic  Function,  (EF = 51% by biplane) Mild left ventricular  enlargement. Normal diastolic function.  Right Heart: Normal right atrium. Normal right ventricular  size and systolic function (TAPSE 2.3 cm). Normal tricuspid  valve. Pulmonic valve not well seen. Trace pulmonic  insufficiency is noted.  Pericardium/PleuraNo pericardial effusion.  Hemodynamic: Insufficient tricuspid regurgitation jet to  allow calculation of RVSP.  ------------------------------------------------------------------------  CONCLUSIONS:  1. Normal mitral valve. Trace mitral regurgitation.  2. Probably trileaflet aortic valve is not well seen. No  aortic stenosis. No aortic valve regurgitation seen.  3. Normal aortic root.  4. Normal left atrium.  5. Mild left ventricular enlargement.  6. Low-nomal Left Ventricular Systolic Function,  (EF = 51%  by biplane)  7. Normal diastolic function.  8. Normal right atrium.  9. Normal right ventricular size and systolic function  (TAPSE 2.3 cm).  10. Normal tricuspid valve.  11. Pulmonic valve not well seen. Trace pulmonic  insufficiency is noted.  12. No pericardial effusion.    *** No previous Echo exam.  ------------------------------------------------------------------------  Confirmed on  3/13/2018 - 10:14:31 by Attila Coker MD  ------------------------------------------------------------------------    < end of copied text >        IMPRESSION: 67y Male PAST MEDICAL & SURGICAL HISTORY:  DVT (deep venous thrombosis)  No significant past surgical history       RECOMMENDATIONS:    2D echo shows normal LV function. Patient noted with episodes of ST and SB, EP consulted, no intervention.  BP appears well controlled.  Tele d/c'd  INR still not therapeutic despite increasing doses of coumadin, heme following, 1.53 today  DVT and GI prophylaxis.

## 2018-03-21 NOTE — PROGRESS NOTE ADULT - PROBLEM SELECTOR PROBLEM 3
Dyspnea on exertion
Pain
Cholelithiases
Dyspnea on exertion
Pain

## 2018-03-21 NOTE — PROGRESS NOTE ADULT - PROBLEM SELECTOR PLAN 5
on imaging  Follow up outpatient
on imaging
on imaging  Follow up outpatient

## 2018-03-21 NOTE — PROGRESS NOTE ADULT - PROBLEM SELECTOR PROBLEM 5
Cholelithiases

## 2018-03-22 VITALS
HEART RATE: 65 BPM | RESPIRATION RATE: 17 BRPM | DIASTOLIC BLOOD PRESSURE: 71 MMHG | OXYGEN SATURATION: 100 % | SYSTOLIC BLOOD PRESSURE: 116 MMHG | TEMPERATURE: 98 F

## 2018-03-22 LAB
APTT BLD: 48.5 SEC — HIGH (ref 27.5–37.4)
BASOPHILS # BLD AUTO: 0.1 K/UL — SIGNIFICANT CHANGE UP (ref 0–0.2)
BASOPHILS NFR BLD AUTO: 1.2 % — SIGNIFICANT CHANGE UP (ref 0–2)
EOSINOPHIL # BLD AUTO: 0.2 K/UL — SIGNIFICANT CHANGE UP (ref 0–0.5)
EOSINOPHIL NFR BLD AUTO: 3.4 % — SIGNIFICANT CHANGE UP (ref 0–6)
HCT VFR BLD CALC: 44 % — SIGNIFICANT CHANGE UP (ref 39–50)
HGB BLD-MCNC: 13.6 G/DL — SIGNIFICANT CHANGE UP (ref 13–17)
INR BLD: 1.9 RATIO — HIGH (ref 0.88–1.16)
LYMPHOCYTES # BLD AUTO: 1.4 K/UL — SIGNIFICANT CHANGE UP (ref 1–3.3)
LYMPHOCYTES # BLD AUTO: 26.8 % — SIGNIFICANT CHANGE UP (ref 13–44)
MCHC RBC-ENTMCNC: 31 GM/DL — LOW (ref 32–36)
MCHC RBC-ENTMCNC: 31.6 PG — SIGNIFICANT CHANGE UP (ref 27–34)
MCV RBC AUTO: 102 FL — HIGH (ref 80–100)
MONOCYTES # BLD AUTO: 0.4 K/UL — SIGNIFICANT CHANGE UP (ref 0–0.9)
MONOCYTES NFR BLD AUTO: 7.4 % — SIGNIFICANT CHANGE UP (ref 2–14)
NEUTROPHILS # BLD AUTO: 3.2 K/UL — SIGNIFICANT CHANGE UP (ref 1.8–7.4)
NEUTROPHILS NFR BLD AUTO: 61.3 % — SIGNIFICANT CHANGE UP (ref 43–77)
PLATELET # BLD AUTO: 258 K/UL — SIGNIFICANT CHANGE UP (ref 150–400)
PROTHROM AB SERPL-ACNC: 21 SEC — HIGH (ref 9.8–12.7)
RBC # BLD: 4.31 M/UL — SIGNIFICANT CHANGE UP (ref 4.2–5.8)
RBC # FLD: 12.5 % — SIGNIFICANT CHANGE UP (ref 10.3–14.5)
WBC # BLD: 5.3 K/UL — SIGNIFICANT CHANGE UP (ref 3.8–10.5)
WBC # FLD AUTO: 5.3 K/UL — SIGNIFICANT CHANGE UP (ref 3.8–10.5)

## 2018-03-22 PROCEDURE — 81241 F5 GENE: CPT

## 2018-03-22 PROCEDURE — 83516 IMMUNOASSAY NONANTIBODY: CPT

## 2018-03-22 PROCEDURE — 85301 ANTITHROMBIN III ANTIGEN: CPT

## 2018-03-22 PROCEDURE — 82746 ASSAY OF FOLIC ACID SERUM: CPT

## 2018-03-22 PROCEDURE — 71275 CT ANGIOGRAPHY CHEST: CPT

## 2018-03-22 PROCEDURE — 85306 CLOT INHIBIT PROT S FREE: CPT

## 2018-03-22 PROCEDURE — 84100 ASSAY OF PHOSPHORUS: CPT

## 2018-03-22 PROCEDURE — 85027 COMPLETE CBC AUTOMATED: CPT

## 2018-03-22 PROCEDURE — 82553 CREATINE MB FRACTION: CPT

## 2018-03-22 PROCEDURE — 93005 ELECTROCARDIOGRAM TRACING: CPT

## 2018-03-22 PROCEDURE — 99285 EMERGENCY DEPT VISIT HI MDM: CPT | Mod: 25

## 2018-03-22 PROCEDURE — 85730 THROMBOPLASTIN TIME PARTIAL: CPT

## 2018-03-22 PROCEDURE — 84443 ASSAY THYROID STIM HORMONE: CPT

## 2018-03-22 PROCEDURE — 82607 VITAMIN B-12: CPT

## 2018-03-22 PROCEDURE — 82550 ASSAY OF CK (CPK): CPT

## 2018-03-22 PROCEDURE — 93971 EXTREMITY STUDY: CPT

## 2018-03-22 PROCEDURE — 85300 ANTITHROMBIN III ACTIVITY: CPT

## 2018-03-22 PROCEDURE — 83036 HEMOGLOBIN GLYCOSYLATED A1C: CPT

## 2018-03-22 PROCEDURE — 83735 ASSAY OF MAGNESIUM: CPT

## 2018-03-22 PROCEDURE — 84484 ASSAY OF TROPONIN QUANT: CPT

## 2018-03-22 PROCEDURE — 80048 BASIC METABOLIC PNL TOTAL CA: CPT

## 2018-03-22 PROCEDURE — 85303 CLOT INHIBIT PROT C ACTIVITY: CPT

## 2018-03-22 PROCEDURE — 80053 COMPREHEN METABOLIC PANEL: CPT

## 2018-03-22 PROCEDURE — 85302 CLOT INHIBIT PROT C ANTIGEN: CPT

## 2018-03-22 PROCEDURE — 82962 GLUCOSE BLOOD TEST: CPT

## 2018-03-22 PROCEDURE — 71045 X-RAY EXAM CHEST 1 VIEW: CPT

## 2018-03-22 PROCEDURE — 85610 PROTHROMBIN TIME: CPT

## 2018-03-22 PROCEDURE — 86803 HEPATITIS C AB TEST: CPT

## 2018-03-22 PROCEDURE — 83921 ORGANIC ACID SINGLE QUANT: CPT

## 2018-03-22 PROCEDURE — 74176 CT ABD & PELVIS W/O CONTRAST: CPT

## 2018-03-22 PROCEDURE — 93306 TTE W/DOPPLER COMPLETE: CPT

## 2018-03-22 PROCEDURE — 80061 LIPID PANEL: CPT

## 2018-03-22 RX ORDER — WARFARIN SODIUM 2.5 MG/1
1 TABLET ORAL
Qty: 15 | Refills: 0 | OUTPATIENT
Start: 2018-03-22 | End: 2018-04-05

## 2018-03-22 RX ADMIN — PANTOPRAZOLE SODIUM 40 MILLIGRAM(S): 20 TABLET, DELAYED RELEASE ORAL at 06:00

## 2018-03-22 RX ADMIN — ENOXAPARIN SODIUM 80 MILLIGRAM(S): 100 INJECTION SUBCUTANEOUS at 06:00

## 2018-03-22 NOTE — PROGRESS NOTE ADULT - PROVIDER SPECIALTY LIST ADULT
Cardiology
Heme/Onc
Internal Medicine
Pulmonology
Internal Medicine

## 2018-03-22 NOTE — PROGRESS NOTE ADULT - SUBJECTIVE AND OBJECTIVE BOX
Patient seen and examined.   Time of visit:    MEDICATIONS  (STANDING):  enoxaparin Injectable 80 milliGRAM(s) SubCutaneous every 12 hours  pantoprazole    Tablet 40 milliGRAM(s) Oral before breakfast  sodium chloride 0.9%. 1000 milliLiter(s) (100 mL/Hr) IV Continuous <Continuous>      MEDICATIONS  (PRN):  acetaminophen   Tablet. 650 milliGRAM(s) Oral every 6 hours PRN Mild Pain (1 - 3)   Medications up to date at time of exam.      PHYSICAL EXAMINATION:  Patient has no new complaints.  GENERAL: The patient is a well-developed, well-nourished, in no apparent distress.     Vital Signs Last 24 Hrs  T(C): 36.8 (22 Mar 2018 07:27), Max: 36.8 (21 Mar 2018 19:25)  T(F): 98.2 (22 Mar 2018 07:27), Max: 98.2 (21 Mar 2018 19:25)  HR: 65 (22 Mar 2018 07:27) (59 - 81)  BP: 116/71 (22 Mar 2018 07:27) (97/60 - 116/71)  BP(mean): --  RR: 17 (22 Mar 2018 07:27) (16 - 18)  SpO2: 100% (22 Mar 2018 07:27) (96% - 100%)   (if applicable)      HEENT: Head is normocephalic and atraumatic.     NECK: Supple, no palpable adenopathy.    LUNGS: Clear to auscultation, no wheezing, rales, or rhonchi.    HEART: Regular rate and rhythm without murmur.    ABDOMEN: Soft, nontender, and nondistended.  No hepatosplenomegaly is noted.    EXTREMITIES: Without any cyanosis, clubbing, rash, lesions or edema.    NEUROLOGIC: Awake, alert.    SKIN: Warm, dry, good turgor.      LABS:                        13.6   5.3   )-----------( 258      ( 22 Mar 2018 10:29 )             44.0           PT/INR - ( 22 Mar 2018 10:29 )   PT: 21.0 sec;   INR: 1.90 ratio         PTT - ( 22 Mar 2018 10:29 )  PTT:48.5 sec        MICROBIOLOGY: (if applicable)    RADIOLOGY & ADDITIONAL STUDIES:  EKG:   CXR:  ECHO:    IMPRESSION: 67y Male PAST MEDICAL & SURGICAL HISTORY:  DVT (deep venous thrombosis)  No significant past surgical history       RECOMMENDATIONS:    2D echo shows normal LV function. Patient noted with episodes of ST and SB, EP consulted, no intervention.  BP appears well controlled.  Tele d/c'd  INR still not therapeutic despite increasing doses of coumadin, heme following, 1.9 today  d/c planning

## 2018-03-22 NOTE — PROGRESS NOTE ADULT - SUBJECTIVE AND OBJECTIVE BOX
Patient seen and examined at bedside  Medical charts and case reviewed  Consultants notes reviewed  Case discussed with medical team    Patient is medically stable and optimized for safe hospital discharge.  Patient completely and fully understands the importance to follow up his INR level within 3 days as outpatient, and agrees to make an appointment with his PMD or outside laboratory value to obtain such a value.  In addition, pt understands the potential adverse effects of coumadin including bleeding, and understands and agrees to seek immediate medical help if he develops adverse effects.   All questions and concerns have been appropriately answered and addressed.

## 2024-04-23 NOTE — H&P ADULT - NSHPPOAPULMEMBOLUS_GEN_A_CORE
Procedure:  OPEN REDUCTION W/ INTERNAL FIXATION (ORIF) ANKLE (Left: Ankle)    Relevant Problems   CARDIO   (+) Essential hypertension   (+) Mixed hyperlipidemia        Physical Exam    Airway    Mallampati score: II  TM Distance: >3 FB  Neck ROM: full     Dental   No notable dental hx     Cardiovascular  Cardiovascular exam normal    Pulmonary  Pulmonary exam normal     Other Findings  post-pubertal.  3/2024-sinus lorraine    Anesthesia Plan  ASA Score- 2     Anesthesia Type- general with ASA Monitors.         Additional Monitors:     Airway Plan: ETT.    Comment: Adductor canal and popliteal.       Plan Factors-Exercise tolerance (METS): >4 METS.    Chart reviewed. EKG reviewed. Imaging results reviewed. Existing labs reviewed. Patient summary reviewed.    Patient is not a current smoker.      Obstructive sleep apnea risk education given perioperatively.        Induction- intravenous.    Postoperative Plan-     Informed Consent- Anesthetic plan and risks discussed with patient.  I personally reviewed this patient with the CRNA. Discussed and agreed on the Anesthesia Plan with the CRNA..                
no